# Patient Record
Sex: MALE | Race: ASIAN | URBAN - METROPOLITAN AREA
[De-identification: names, ages, dates, MRNs, and addresses within clinical notes are randomized per-mention and may not be internally consistent; named-entity substitution may affect disease eponyms.]

---

## 2021-04-09 ENCOUNTER — AMBULATORY - HEALTHEAST (OUTPATIENT)
Dept: NURSING | Facility: CLINIC | Age: 26
End: 2021-04-09

## 2021-04-30 ENCOUNTER — AMBULATORY - HEALTHEAST (OUTPATIENT)
Dept: NURSING | Facility: CLINIC | Age: 26
End: 2021-04-30

## 2023-01-27 DIAGNOSIS — R06.83 SNORING: Primary | ICD-10-CM

## 2023-02-11 ENCOUNTER — HEALTH MAINTENANCE LETTER (OUTPATIENT)
Age: 28
End: 2023-02-11

## 2023-05-15 ENCOUNTER — OFFICE VISIT (OUTPATIENT)
Dept: SLEEP MEDICINE | Facility: CLINIC | Age: 28
End: 2023-05-15
Payer: COMMERCIAL

## 2023-05-15 ENCOUNTER — TELEPHONE (OUTPATIENT)
Dept: SLEEP MEDICINE | Facility: CLINIC | Age: 28
End: 2023-05-15

## 2023-05-15 VITALS
HEIGHT: 67 IN | HEART RATE: 82 BPM | BODY MASS INDEX: 29.82 KG/M2 | RESPIRATION RATE: 16 BRPM | DIASTOLIC BLOOD PRESSURE: 71 MMHG | OXYGEN SATURATION: 100 % | WEIGHT: 190 LBS | SYSTOLIC BLOOD PRESSURE: 115 MMHG

## 2023-05-15 DIAGNOSIS — G47.9 SLEEP DISTURBANCE: Primary | ICD-10-CM

## 2023-05-15 DIAGNOSIS — F33.41 RECURRENT MAJOR DEPRESSIVE DISORDER, IN PARTIAL REMISSION (H): ICD-10-CM

## 2023-05-15 DIAGNOSIS — G47.00 INSOMNIA, UNSPECIFIED TYPE: ICD-10-CM

## 2023-05-15 DIAGNOSIS — R06.83 SNORING: ICD-10-CM

## 2023-05-15 DIAGNOSIS — E66.3 OVERWEIGHT (BMI 25.0-29.9): ICD-10-CM

## 2023-05-15 PROCEDURE — 99204 OFFICE O/P NEW MOD 45 MIN: CPT | Performed by: NURSE PRACTITIONER

## 2023-05-15 RX ORDER — GABAPENTIN 100 MG/1
CAPSULE ORAL
COMMUNITY
Start: 2023-03-15 | End: 2024-05-24

## 2023-05-15 RX ORDER — DULOXETIN HYDROCHLORIDE 60 MG/1
60 CAPSULE, DELAYED RELEASE ORAL 2 TIMES DAILY
COMMUNITY
Start: 2023-04-01

## 2023-05-15 RX ORDER — HYDROXYZINE HYDROCHLORIDE 25 MG/1
TABLET, FILM COATED ORAL
COMMUNITY
Start: 2023-01-03

## 2023-05-15 RX ORDER — MULTIPLE VITAMINS W/ MINERALS TAB 9MG-400MCG
1 TAB ORAL DAILY
COMMUNITY

## 2023-05-15 RX ORDER — BUPROPION HYDROCHLORIDE 300 MG/1
TABLET ORAL
COMMUNITY
Start: 2022-04-07

## 2023-05-15 RX ORDER — ARIPIPRAZOLE 5 MG/1
5 TABLET ORAL DAILY
COMMUNITY
Start: 2023-05-11

## 2023-05-15 RX ORDER — TRAZODONE HYDROCHLORIDE 50 MG/1
TABLET, FILM COATED ORAL
COMMUNITY
Start: 2023-03-15 | End: 2024-05-24

## 2023-05-15 ASSESSMENT — SLEEP AND FATIGUE QUESTIONNAIRES
HOW LIKELY ARE YOU TO NOD OFF OR FALL ASLEEP WHILE LYING DOWN TO REST IN THE AFTERNOON WHEN CIRCUMSTANCES PERMIT: MODERATE CHANCE OF DOZING
HOW LIKELY ARE YOU TO NOD OFF OR FALL ASLEEP WHILE SITTING AND READING: WOULD NEVER DOZE
HOW LIKELY ARE YOU TO NOD OFF OR FALL ASLEEP WHILE SITTING INACTIVE IN A PUBLIC PLACE: WOULD NEVER DOZE
HOW LIKELY ARE YOU TO NOD OFF OR FALL ASLEEP WHEN YOU ARE A PASSENGER IN A CAR FOR AN HOUR WITHOUT A BREAK: SLIGHT CHANCE OF DOZING
HOW LIKELY ARE YOU TO NOD OFF OR FALL ASLEEP WHILE WATCHING TV: WOULD NEVER DOZE
HOW LIKELY ARE YOU TO NOD OFF OR FALL ASLEEP WHILE SITTING QUIETLY AFTER LUNCH WITHOUT ALCOHOL: WOULD NEVER DOZE
HOW LIKELY ARE YOU TO NOD OFF OR FALL ASLEEP IN A CAR, WHILE STOPPED FOR A FEW MINUTES IN TRAFFIC: WOULD NEVER DOZE
HOW LIKELY ARE YOU TO NOD OFF OR FALL ASLEEP WHILE SITTING AND TALKING TO SOMEONE: WOULD NEVER DOZE

## 2023-05-15 NOTE — NURSING NOTE
SCHEDULED FOLLOW UP BUT LVMTCB AND RESCHEDULE HST  AND DROP OFF DUE TO POSSIBLE PA NEEDED. ALSO SENT [employer] MESSAGE.    Josue Macias, Respiratory DME Coordinator

## 2023-05-15 NOTE — PATIENT INSTRUCTIONS
"Medicare and Medicaid patients starting on CPAP or biPAP on or after 5/12/2023  Medicare patients should schedule an IN PERSON CLINIC appointment to provide your CPAP/biPAP usage data to a provider within 90 days of starting CPAP    Virtual visits are no longer allowed for this visit for Medicare                MY TREATMENT INFORMATION FOR SLEEP APNEA-  Adan Britton    DOCTOR : MEERA Lane CNP    Am I having a sleep study at a sleep center?  --->Due to normal delays, you will be contacted within 2-4 weeks to schedule    Am I having a home sleep study?  --->Watch the video for the device you are using:    -/drop off device-   https://www.XM Radio.com/watch?v=yGGFBdELGhk    -Disposable device sent out require phone/computer application-   https://www.StoreFlixube.com/watch?v=BCce_vbiwxE      Frequently asked questions:  1. What is Obstructive Sleep Apnea (ANTONIO)? ANTONIO is the most common type of sleep apnea. Apnea means, \"without breath.\"  Apnea is most often caused by narrowing or collapse of the upper airway as muscles relax during sleep.   Almost everyone has occasional apneas. Most people with sleep apnea have had brief interruptions at night frequently for many years.  The severity of sleep apnea is related to how frequent and severe the events are.   2. What are the consequences of ANTONIO? Symptoms include: feeling sleepy during the day, snoring loudly, gasping or stopping of breathing, trouble sleeping, and occasionally morning headaches or heartburn at night.  Sleepiness can be serious and even increase the risk of falling asleep while driving. Other health consequences may include development of high blood pressure and other cardiovascular disease in persons who are susceptible. Untreated ANTONIO  can contribute to heart disease, stroke and diabetes.   3. What are the treatment options? In most situations, sleep apnea is a lifelong disease that must be managed with daily therapy. Medications are not effective " for sleep apnea and surgery is generally not considered until other therapies have been tried. Your treatment is your choice . Continuous Positive Airway (CPAP) works right away and is the therapy that is effective in nearly everyone. An oral device to hold your jaw forward is usually the next most reliable option. Other options include postioning devices (to keep you off your back), weight loss, and surgery including a tongue pacing device. There is more detail about some of these options below.  4. Are my sleep studies covered by insurance? Although we will request verification of coverage, we advise you also check in advance of the study to ensure there is coverage.    Important tips for those choosing CPAP and similar devices  For new devices, sign up for device HILTON to monitor your device for your followup visits  We encourage you to utilize the [employer] hilton or website (myAir web (resmed.com) ) to monitor your therapy progress and share the data with your healthcare team when you discuss your sleep apnea.                                                    Know your equipment:  CPAP is continuous positive airway pressure that prevents obstructive sleep apnea by keeping the throat from collapsing while you are sleeping. In most cases, the device is  smart  and can slowly self-adjusts if your throat collapses and keeps a record every day of how well you are treated-this information is available to you and your care team.  BPAP is bilevel positive airway pressure that keeps your throat open and also assists each breath with a pressure boost to maintain adequate breathing.  Special kinds of BPAP are used in patients who have inadequate breathing from lung or heart disease. In most cases, the device is  smart  and can slowly self-adjusts to assist breathing. Like CPAP, the device keeps a record of how well you are treated.  Your mask is your connection to the device. You get to choose what feels most  comfortable and the staff will help to make sure if fits. Here: are some examples of the different masks that are available:       Key points to remember on your journey with sleep apnea:  Sleep study.  PAP devices often need to be adjusted during a sleep study to show that they are effective and adjusted right.  Good tips to remember: Try wearing just the mask during a quiet time during the day so your body adapts to wearing it. A humidifier is recommended for comfort in most cases to prevent drying of your nose and throat. Allergy medication from your provider may help you if you are having nasal congestion.  Getting settled-in. It takes more than one night for most of us to get used to wearing a mask. Try wearing just the mask during a quiet time during the day so your body adapts to wearing it. A humidifier is recommended for comfort in most cases. Our team will work with you carefully on the first day and will be in contact within 4 days and again at 2 and 4 weeks for advice and remote device adjustments. Your therapy is evaluated by the device each day.   Use it every night. The more you are able to sleep naturally for 7-8 hours, the more likely you will have good sleep and to prevent health risks or symptoms from sleep apnea. Even if you use it 4 hours it helps. Occasionally all of us are unable to use a medical therapy, in sleep apnea, it is not dangerous to miss one night.   Communicate. Call our skilled team on the number provided on the first day if your visit for problems that make it difficult to wear the device. Over 2 out of 3 patients can learn to wear the device long-term with help from our team. Remember to call our team or your sleep providers if you are unable to wear the device as we may have other solutions for those who cannot adapt to mask CPAP therapy. It is recommended that you sleep your sleep provider within the first 3 months and yearly after that if you are not having problems.   Use it  for your health. We encourage use of CPAP masks during daytime quiet periods to allow your face and brain to adapt to the sensation of CPAP so that it will be a more natural sensation to awaken to at night or during naps. This can be very useful during the first few weeks or months of adapting to CPAP though it does not help medically to wear CPAP during wakefulness and  should not be used as a strategy just to meet guidelines.  Take care of your equipment. Make sure you clean your mask and tubing using directions every day and that your filter and mask are replaced as recommended or if they are not working.     BESIDES CPAP, WHAT OTHER THERAPIES ARE THERE?    Positioning Device  Positioning devices are generally used when sleep apnea is mild and only occurs on your back.This example shows a pillow that straps around the waist. It may be appropriate for those whose sleep study shows milder sleep apnea that occurs primarily when lying flat on one's back. Preliminary studies have shown benefit but effectiveness at home may need to be verified by a home sleep test. These devices are generally not covered by medical insurance.  Examples of devices that maintain sleeping on the back to prevent snoring and mild sleep apnea.    Belt type body positioner  http://[facility].[employer]/    Electronic reminder  http://nightshifttherapy.com/            Oral Appliance  What is oral appliance therapy?  An oral appliance device fits on your teeth at night like a retainer used after having braces. The device is made by a specialized dentist and requires several visits over 1-2 months before a manufactured device is made to fit your teeth and is adjusted to prevent your sleep apnea. Once an oral device is working properly, snoring should be improved. A home sleep test may be recommended at that time if to determine whether the sleep apnea is adequately treated.       Some things to remember:  -Oral devices are often, but not always, covered  by your medical insurance. Be sure to check with your insurance provider.   -If you are referred for oral therapy, you will be given a list of specialized dentists to consider or you may choose to visit the Web site of the American Academy of Dental Sleep Medicine  -Oral devices are less likely to work if you have severe sleep apnea or are extremely overweight.     More detailed information  An oral appliance is a small acrylic device that fits over the upper and lower teeth  (similar to a retainer or a mouth guard). This device slightly moves jaw forward, which moves the base of the tongue forward, opens the airway, improves breathing for effective treat snoring and obstructive sleep apnea in perhaps 7 out of 10 people .  The best working devices are custom-made by a dental device  after a mold is made of the teeth 1, 2, 3.  When is an oral appliance indicated?  Oral appliance therapy is recommended as a first-line treatment for patients with primary snoring, mild sleep apnea, and for patients with moderate sleep apnea who prefer appliance therapy to use of CPAP4, 5. Severity of sleep apnea is determined by sleep testing and is based on the number of respiratory events per hour of sleep.   How successful is oral appliance therapy?  The success rate of oral appliance therapy in patients with mild sleep apnea is 75-80% while in patients with moderate sleep apnea it is 50-70%. The chance of success in patients with severe sleep apnea is 40-50%. The research also shows that oral appliances have a beneficial effect on the cardiovascular health of ANTONIO patients at the same magnitude as CPAP therapy7.  Oral appliances should be a second-line treatment in cases of severe sleep apnea, but if not completely successful then a combination therapy utilizing CPAP plus oral appliance therapy may be effective. Oral appliances tend to be effective in a broad range of patients although studies show that the patients who  have the highest success are females, younger patients, those with milder disease, and less severe obesity. 3, 6.   Finding a dentist that practices dental sleep medicine  Specific training is available through the American Academy of Dental Sleep Medicine for dentists interested in working in the field of sleep. To find a dentist who is educated in the field of sleep and the use of oral appliances, near you, visit the Web site of the American Academy of Dental Sleep Medicine.    References  1. Taylor, et al. Objectively measured vs self-reported compliance during oral appliance therapy for sleep-disordered breathing. Chest 2013; 144(5): 2650-8542.  2. Lorri, et al. Objective measurement of compliance during oral appliance therapy for sleep-disordered breathing. Thorax 2013; 68(1): 91-96.  3. Tara et al. Mandibular advancement devices in 620 men and women with ANTONIO and snoring: tolerability and predictors of treatment success. Chest 2004; 125: 6594-5061.  4. Zhanna, et al. Oral appliances for snoring and ANTONIO: a review. Sleep 2006; 29: 244-262.  5. Robert et al. Oral appliance treatment for ANTONIO: an update. J Clin Sleep Med 2014; 10(2): 215-227.  6. Soledad et al. Predictors of OSAH treatment outcome. J Dent Res 2007; 86: 8581-1461.      Weight Loss:    Weight loss is a long-term strategy that may improve sleep apnea in some patients.    Weight management is a personal decision and the decision should be based on your interest and the potential benefits.  If you are interested in exploring weight loss strategies, the following discussion covers the impact on weight loss on sleep apnea and the approaches that may be successful.    Being overweight does not necessarily mean you will have health consequences.  Those who have BMI over 35 or over 27 with existing medical conditions carries greater risk.   Weight loss decreases severity of sleep apnea in most people with obesity. For those with mild  obesity who have developed snoring with weight gain, even 15-30 pound weight loss can improve and occasionally eliminate sleep apnea.  Structured and life-long dietary and health habits are necessary to lose weight and keep healthier weight levels.     Though there may be significant health benefits from weight loss, long-term weight loss is very difficult to achieve- studies show success with dietary management in less than 10% of people. In addition, substantial weight loss may require years of dietary control and may be difficult if patients have severe obesity. In these cases, surgical management may be considered.  Finally, older individuals who have tolerated obesity without health complications may be less likely to benefit from weight loss strategies.      Your BMI is Body mass index is 29.76 kg/m .    What is BMI?  Body mass index (BMI) is one way to tell whether you are at a healthy weight, overweight, or obese. It measures your weight in relation to your height.  A BMI of 18.5 to 24.9 is in the healthy range. A person with a BMI of 25 to 29.9 is considered overweight, and someone with a BMI of 30 or greater is considered obese.  Another way to find out if you are at risk for health problems caused by overweight and obesity is to measure your waist. If you are a woman and your waist is more than 35 inches, or if you are a man and your waist is more than 40 inches, your risk of disease may be higher.  More than two-thirds of American adults are considered overweight or obese. Being overweight or obese increases the risk for further weight gain.  Excess weight may lead to heart disease and diabetes. Creating and following plans for healthy eating and physical activity may help you improve your health.    Methods for maintaining or losing weight.  Weight control is part of healthy lifestyle and includes exercise, emotional health, and healthy eating habits.  Careful eating habits lifelong is the mainstay of  weight control.  Though there are significant health benefits from weight loss, long-term weight loss with diet alone may be very difficult to achieve- studies show long-term success with dietary management in less than 10% of people. Attaining a healthy weight may be especially difficult to achieve in those with severe obesity. In some cases, medications, devices and surgical management might be considered.    What can you do?  If you are overweight or obese and are interested in methods for weight loss, you should discuss this with your provider. In addition, we recommend that you review healthy life styles and methods for weight loss available through the National Institutes of Health patient information sites:   http://win.niddk.nih.gov/publications/index.htm    Surgery:    Surgery for obstructive sleep apnea is considered generally only when other therapies fail to work. Surgery may be discussed with you if you are having a difficult time tolerating CPAP and or when there is an abnormal structure that requires surgical correction.  Nose and throat surgeries often enlarge the airway to prevent collapse.  Most of these surgeries create pain for 1-2 weeks and up to half of the most common surgeries are not effective throughout life.  You should carefully discuss the benefits and drawbacks to surgery with your sleep provider and surgeon to determine if it is the best solution for you.   More information  Surgery for ANTONIO is directed at areas that are responsible for narrowing or complete obstruction of the airway during sleep.  There are a wide range of procedures available to enlarge and/or stabilize the airway to prevent blockage of breathing in the three major areas where it can occur: the palate, tongue, and nasal regions.  Successful surgical treatment depends on the accurate identification of the factors responsible for obstructive sleep apnea in each person.  A personalized approach is required because there  is no single treatment that works well for everyone.  Because of anatomic variation, consultation with an examination by a sleep surgeon is a critical first step in determining what surgical options are best for each patient.  In some cases, examination during sedation may be recommended in order to guide the selection of procedures.  Patients will be counseled about risks and benefits as well as the typical recovery course after surgery. Surgery is typically not a cure for a person s ANTONIO.  However, surgery will often significantly improve one s ANTONIO severity (termed  success rate ).  Even in the absence of a cure, surgery will decrease the cardiovascular risk associated with OSA7; improve overall quality of life8 (sleepiness, functionality, sleep quality, etc).      Palate Procedures:  Patients with ANTONIO often have narrowing of their airway in the region of their tonsils and uvula.  The goals of palate procedures are to widen the airway in this region as well as to help the tissues resist collapse.  Modern palate procedure techniques focus on tissue conservation and soft tissue rearrangement, rather than tissue removal.  Often the uvula is preserved in this procedure. Residual sleep apnea is common in patient after pharyngoplasty with an average reduction in sleep apnea events of 33%2.      Tongue Procedures:  ExamWhile patients are awake, the muscles that surround the throat are active and keep this region open for breathing. These muscles relax during sleep, allowing the tongue and other structures to collapse and block breathing.  There are several different tongue procedures available.  Selection of a tongue base procedure depends on characteristics seen on physical exam.  Generally, procedures are aimed at removing bulky tissues in this area or preventing the back of the tongue from falling back during sleep.  Success rates for tongue surgery range from 50-62%3.    Hypoglossal Nerve Stimulation:  Hypoglossal  nerve stimulation has recently received approval from the [place] Food and Drug Administration for the treatment of obstructive sleep apnea.  This is based on research showing that the system was safe and effective in treating sleep apnea6.  Results showed that the median AHI score decreased 68%, from 29.3 to 9.0. This therapy uses an implant system that senses breathing patterns and delivers mild stimulation to airway muscles, which keeps the airway open during sleep.  The system consists of three fully implanted components: a small generator (similar in size to a pacemaker), a breathing sensor, and a stimulation lead.  Using a small handheld remote, a patient turns the therapy on before bed and off upon awakening.    Candidates for this device must be greater than 18 years of age, have moderate to severe ANTONIO (AHI between 15-65), BMI less than 35, have tried CPAP/oral appliance for at least 8 weeks without success, and have appropriate upper airway anatomy (determined by a sleep endoscopy performed by Dr. Cooper Milan).    Hypoglossal Nerve Stimulation Pathway:    The sleep surgeon s office will work with the patient through the insurance prior-authorization process (including communications and appeals).    Nasal Procedures:  Nasal obstruction can interfere with nasal breathing during the day and night.  Studies have shown that relief of nasal obstruction can improve the ability of some patients to tolerate positive airway pressure therapy for obstructive sleep apnea1.  Treatment options include medications such as nasal saline, topical corticosteroid and antihistamine sprays, and oral medications such as antihistamines or decongestants. Non-surgical treatments can include external nasal dilators for selected patients. If these are not successful by themselves, surgery can improve the nasal airway either alone or in combination with these other options.      Combination Procedures:  Combination of surgical  procedures and other treatments may be recommended, particularly if patients have more than one area of narrowing or persistent positional disease.  The success rate of combination surgery ranges from 66-80%2,3.    References  Tamara PITT. The Role of the Nose in Snoring and Obstructive Sleep Apnoea: An Update.  Eur Arch Otorhinolaryngol. 2011; 268: 1365-73.   Norbert SM; Adore JA; Khadar JR; Pallanch JF; Deyvi MB; Rod SG; Angeles COTTRELL. Surgical modifications of the upper airway for obstructive sleep apnea in adults: a systematic review and meta-analysis. SLEEP 2010;33(10):8930-2838. Natividad RICO. Hypopharyngeal surgery in obstructive sleep apnea: an evidence-based medicine review.  Arch Otolaryngol Head Neck Surg. 2006 Feb;132(2):206-13.  Ray YH1, Miriam Y, Jose REED. The efficacy of anatomically based multilevel surgery for obstructive sleep apnea. Otolaryngol Head Neck Surg. 2003 Oct;129(4):327-35.  Kezirian E, Goldberg A. Hypopharyngeal Surgery in Obstructive Sleep Apnea: An Evidence-Based Medicine Review. Arch Otolaryngol Head Neck Surg. 2006 Feb;132(2):206-13.  Randell PJ et al. Upper-Airway Stimulation for Obstructive Sleep Apnea.  N Engl J Med. 2014 Jan 9;370(2):139-49.  Sabine Y et al. Increased Incidence of Cardiovascular Disease in Middle-aged Men with Obstructive Sleep Apnea. Am J Respir Crit Care Med; 2002 166: 159-165  Cheung EM et al. Studying Life Effects and Effectiveness of Palatopharyngoplasty (SLEEP) study: Subjective Outcomes of Isolated Uvulopalatopharyngoplasty. Otolaryngol Head Neck Surg. 2011; 144: 623-631.        WHAT IF I ONLY HAVE SNORING?    Mandibular advancement devices, lateral sleep positioning, long-term weight loss and treatment of nasal allergies have been shown to improve snoring.  Exercising tongue muscles with a game (https://apps.[employer]/us/hilton/soundly-reduce-snoring/ac2712307972) or stimulating the tongue during the day with a device (https://doi.org/10.3040/red41757944) have  improved snoring in some individuals.    Remember to Drive Safe... Drive Alive     Sleep health profoundly affects your health, mood, and your safety.  Thirty three percent of the population (one in three of us) is not getting enough sleep and many have a sleep disorder. Not getting enough sleep or having an untreated / undertreated sleep condition may make us sleepy without even knowing it. In fact, our driving could be dramatically impaired due to our sleep health. As your provider, here are some things I would like you to know about driving:     Here are some warning signs for impairment and dangerous drowsy driving:              -Having been awake more than 16 hours               -Looking tired               -Eyelid drooping              -Head nodding (it could be too late at this point)              -Driving for more than 30 minutes     Some things you could do to make the driving safer if you are experiencing some drowsiness:              -Stop driving and rest              -Call for transportation              -Make sure your sleep disorder is adequately treated     Some things that have been shown NOT to work when experiencing drowsiness while driving:              -Turning on the radio              -Opening windows              -Eating any  distracting  /  entertaining  foods (e.g., sunflower seeds, candy, or any other)              -Talking on the phone      Your decision may not only impact your life, but also the life of others. Please, remember to drive safe for yourself and all of us.

## 2023-05-15 NOTE — NURSING NOTE
SCHEDULE DTaP FOR HST AND FOLLOW UP, WILL SEND [place] MESSAGE WITH APPT DETAILS.    Josue Macias

## 2023-05-15 NOTE — PROGRESS NOTES
Outpatient Sleep Medicine Consultation:      Name: Adan Britton MRN# [id number]   Age: 27 year old YOB: 1995     Date of Consultation: May 15, 2023  Consultation is requested by: Ying Delaney [facility]  Primary care provider: No Ref-Primary, Physician       Reason for Sleep Consult:     Adan Britton is sent by Ying Olvera for a sleep consultation regarding snoring, RLS?.    Patient s Reason for visit  Adan Britton main reason for visit: Irregular sleep patterns  Patient states problem(s) started: 2020  Adan Britton's goals for this visit: Normalize sleep patterns           Assessment and Plan:     Summary Sleep Diagnoses:  1. Sleep disturbance  2. Snoring  3. Insomnia, unspecified type  4. Recurrent major depressive disorder, in partial remission (H)  5. Overweight (BMI 25.0-29.9)  - Adult Sleep Eval & Management  Referral  - HST-Home Sleep Apnea Test - Noxturnal Returnable; Future      Comorbid Diagnoses:  1.  Major depression  2.  Anxiety  3.  Overweight      Summary Recommendations:  1.  Recommend further evaluation with home sleep apnea testing (HST) for possible sleep disordered breathing.  His STOP-BANG score is 2 which suggest a low risk of ANTONIO, however, the patient does sleep alone and his symptoms may be underestimated.  There is some question of whether or not he has RLS symptoms due to reports of kicking his legs at night previously.  His symptoms are consistent with possible obstructive sleep apnea.  2.  All potential therapeutic options including positive airway pressure, mandibular advancing oral appliances, and surgical options were discussed. Also counseled about impact of weight loss on ANTONIO.   3.  Follow-up in approximately 2 weeks after the sleep study to review the results and to determine next steps.    Orders Placed This Encounter   Procedures     HST-Home Sleep Apnea Test - Noxturnal Returnable         Summary Counseling:    Sleep Testing  Reviewed  Obstructive Sleep Apnea Reviewed  Complications of Untreated Sleep Apnea Reviewed      Medical Decision-making:   Educational materials provided in instructions    Total time spent reviewing medical records, history and physical examination, review of previous testing and interpretation as well as documentation on this date: 45 minutes    CC: No ref. provider found          History of Present Illness:     Adan Britton is a 27-year-old male with a pertinent past medical history of major depression, anxiety, who presents today with symptoms of waking up every 2 hours per night, these symptoms wax and wanes about every 3 weeks, occasionally wakes with feeling of shortness of breath, denies urge to move legs at night that does not prevent him from sleeping, but has been told he moves his legs in sleep at times.  This patient was referred by his primary care provider for further evaluation of possible sleep disordered breathing.    Past Sleep Evaluations: No    SLEEP-WAKE SCHEDULE:     Work/School Days: Patient goes to school/work: Yes   Usually gets into bed at 10.30pm-11.30pm  Takes patient about 30min to fall asleep  Has trouble falling asleep 2 nights per week  Wakes up in the middle of the night 2-3 times.  Wakes up due to Uncertain  He has trouble falling back asleep 2 times a week.   It usually takes Depends to get back to sleep  Patient is usually up at 9am  Uses alarm: Yes    Weekends/Non-work Days/All Other Days:  Usually gets into bed at 11pm-Midnight   Takes patient about 30min to fall asleep  Patient is usually up at 10am-11am  Uses alarm: No    Sleep Need  Patient gets  9-10h sleep on average   Patient thinks he needs about 10h sleep    Adan Britton prefers to sleep in this position(s): Back   Patient states they do the following activities in bed: Use phone, computer, or tablet    Naps  Patient takes a purposeful nap 2 times a week and naps are usually 2h in duration  He feels better after a nap:  Yes  He dozes off unintentionally 0 days per week  Patient has had a driving accident or near-miss due to sleepiness/drowsiness: No      SLEEP DISRUPTIONS:    Breathing/Snoring  Patient snores:Yes  Other people complain about his snoring: Yes  Patient has been told he stops breathing in his sleep:No  He has issues with the following: Morning mouth dryness    Movement:  Patient gets pain, discomfort, with an urge to move:  No  It happens when he is resting:  No  It happens more at night:  No  Patient has been told he kicks his legs at night:  Yes     Behaviors in Sleep:  Adan Britton has experienced the following behaviors while sleeping: Teeth grinding - no mouthguard use  He has experienced sudden muscle weakness during the day: No      Is there anything else you would like your sleep provider to know:        CAFFEINE AND OTHER SUBSTANCES:    Patient consumes caffeinated beverages per day:  1, 4 shots  Last caffeine use is usually: Noon-2pm  List of any prescribed or over the counter stimulants that patient takes:    List of any prescribed or over the counter sleep medication patient takes: Gabapentin 100mg, Trazodone 200mg  List of previous sleep medications that patient has tried: Hydroxyzine, Gabapentin 600mg  Patient drinks alcohol to help them sleep: No  Patient drinks alcohol near bedtime: No    Family History:  Patient has a family member been diagnosed with a sleep disorder: Yes  Insomnia, Narcolepsy         SCALES:    EPWORTH SLEEPINESS SCALE         5/15/2023     7:42 AM    Madeline Sleepiness Scale ( GAB Kevin  1990-1997[facility]/English - Final version - 21 Nov 07 - [facility] [place].)   Sitting and reading Would never doze   Watching TV Would never doze   Sitting, inactive in a public place (e.g. a theatre or a meeting) Would never doze   As a passenger in a car for an hour without a break Slight chance of dozing   Lying down to rest in the afternoon when circumstances permit Moderate chance of  "dozing   Sitting and talking to someone Would never doze   Sitting quietly after a lunch without alcohol Would never doze   In a car, while stopped for a few minutes in traffic Would never doze   Durkee Score (MC) 3   Durkee Score (Sleep) 3         INSOMNIA SEVERITY INDEX (CHRISTIAN)          5/15/2023     7:36 AM   Insomnia Severity Index (CHRISTIAN)   Difficulty falling asleep 1   Difficulty staying asleep 2   Problems waking up too early 2   How SATISFIED/DISSATISFIED are you with your CURRENT sleep pattern? 3   How NOTICEABLE to others do you think your sleep problem is in terms of impairing the quality of your life? 2   How WORRIED/DISTRESSED are you about your current sleep problem? 3   To what extent do you consider your sleep problem to INTERFERE with your daily functioning (e.g. daytime fatigue, mood, ability to function at work/daily chores, concentration, memory, mood, etc.) CURRENTLY? 2   CHRISTIAN Total Score 15       Guidelines for Scoring/Interpretation:  Total score categories:  0-7 = No clinically significant insomnia   8-14 = Subthreshold insomnia   15-21 = Clinical insomnia (moderate severity)  22-28 = Clinical insomnia (severe)  Used via courtesy of www.[employer].va.gov with permission from Win Lema PhD., [facility]      STOP BANG score: 2        5/15/2023     8:21 AM   STOP BANG Questionnaire (  2008, the American Society of Anesthesiologists, Inc. [employer].)   B/P Clinic: 115/71   BMI Clinic: 29.76         GAD7         View : No data to display.                  CAGE-AID         View : No data to display.                CAGE-AID reprinted with permission from the [place] Medical Journal, KISHAN Fernandez. and LAURIE Vivar, \"Conjoint screening questionnaires for alcohol and drug abuse\" [place] Medical Journal 94: 135-140, 1995.      PATIENT HEALTH QUESTIONNAIRE-9 (PHQ - 9)         View : No data to display.                Developed by Bre Montes De Oca " Floyd Henderson and colleagues, with an educational krysten from [employer] No permission required to reproduce, translate, display or distribute.        Allergies:    Allergies   Allergen Reactions     Ibuprofen Rash       Medications:    Current Outpatient Medications   Medication Sig Dispense Refill     ARIPiprazole (ABILIFY) 5 MG tablet        buPROPion (WELLBUTRIN XL) 300 MG 24 hr tablet        cholecalciferol (VITAMIN D3) 125 mcg (5000 units) capsule        DULoxetine (CYMBALTA) 60 MG capsule        gabapentin (NEURONTIN) 100 MG capsule        hydrOXYzine (ATARAX) 25 MG tablet AS DIRECTED 1TAB IN AM OR AFTERNOON AS NEEDED FOR ANXIETY &2-4TABS AT BEDTIME AS NEEDED FOR INSOMNIA       multivitamin w/minerals (MULTI-VITAMIN) tablet Take 1 tablet by mouth daily       traZODone (DESYREL) 50 MG tablet          Problem List:  There are no problems to display for this patient.       Past Medical/Surgical History:  No past medical history on file.  No past surgical history on file.    Social History:  Social History     Socioeconomic History     Marital status: Single     Spouse name: Not on file     Number of children: Not on file     Years of education: Not on file     Highest education level: Not on file   Occupational History     Not on file   Tobacco Use     Smoking status: Never     Smokeless tobacco: Never   Vaping Use     Vaping status: Never Used   Substance and Sexual Activity     Alcohol use: Not on file     Drug use: Not on file     Sexual activity: Not on file   Other Topics Concern     Not on file   Social History Narrative     Not on file     Social Determinants of Health     Financial Resource Strain: Not on file   Food Insecurity: Not on file   Transportation Needs: Not on file   Physical Activity: Not on file   Stress: Not on file   Social Connections: Not on file   Intimate Partner Violence: Not on file   Housing Stability: Not on file       Family History:  No family history on file.    Review  "of Systems:  A complete review of systems reviewed by me is negative with the exeption of what has been mentioned in the history of present illness.  In the last TWO WEEKS have you experienced any of the following symptoms?  Fevers: No  Night Sweats: Yes  Weight Gain: No  Pain at Night: No  Double Vision: No  Changes in Vision: No  Difficulty Breathing through Nose: No  Sore Throat in Morning: No  Dry Mouth in the Morning: Yes  Shortness of Breath Lying Flat: No  Shortness of Breath With Activity: No  Awakening with Shortness of Breath: No  Increased Cough: No  Heart Racing at Night: No  Swelling in Feet or Legs: No  Diarrhea at Night: No  Heartburn at Night: No  Urinating More than Once at Night: No  Losing Control of Urine at Night: No  Joint Pains at Night: No  Headaches in Morning: No  Weakness in Arms or Legs: No  Depressed Mood: Yes  Anxiety: Yes     Physical Examination:  Vitals: /71   Pulse 82   Resp 16   Ht 1.702 m (5' 7\")   Wt 86.2 kg (190 lb)   SpO2 100%   BMI 29.76 kg/m    BMI= Body mass index is 29.76 kg/m .           GENERAL APPEARANCE: healthy, alert, no distress and cooperative  EYES: Eyes grossly normal to inspection, PERRL, conjunctivae and sclerae normal, lids and lashes normal and wearing eyeglasses  HENT: nose and mouth without ulcers or lesions, oropharynx crowded, tongue base enlarged, oral mucous membranes moist and normal cephalic/atraumatic  NECK: no adenopathy, no asymmetry, masses, or scars, thyroid normal to palpation and trachea midline and normal to palpation  RESP: lungs clear to auscultation - no rales, rhonchi or wheezes  CV: regular rates and rhythm, normal S1 S2, no S3 or S4 and no murmur, click or rub  ABDOMEN: bowel sounds normal and soft, non-tender  MS: extremities normal- no gross deformities noted  NEURO: Alert and oriented x3, normal strength and tone, mentation intact and speech normal  PSYCH: mentation appears normal and affect normal/bright  Mallampati Class: " II.  Tonsillar Stage: 3  extending beyond pillars.         Data: All pertinent previous laboratory data reviewed     No results for input(s): NA, POTASSIUM, CHLORIDE, CO2, ANIONGAP, GLC, BUN, CR, YANNICK in the last 12264 hours.    No results for input(s): WBC, RBC, HGB, HCT, MCV, MCH, MCHC, RDW, PLT in the last 51833 hours.    No results for input(s): PROTTOTAL, ALBUMIN, BILITOTAL, ALKPHOS, AST, ALT, BILIDIRECT in the last 89358 hours.    No results found for: TSH    No results found for: UAMP, UBARB, BENZODIAZEUR, UCANN, UCOC, OPIT, UPCP    No results found for: IRONSAT, [id number], JAZZY    No results found for: PH, PHARTERIAL, PO2, UB9ATOVUCXI, SAT, PCO2, HCO3, BASEEXCESS, JORDY, BEB    @LABRCNTIPR(phv:4,pco2v:4,po2v:4,hco3v:4,johan:4,o2per:4)@    Echocardiology: No results found for this or any previous visit (from the past 4320 hour(s)).    Chest x-ray: No results found for this or any previous visit from the past 365 days.      Chest CT: No results found for this or any previous visit from the past 365 days.      PFT: Most Recent [place] Pulmonary Function Testing    No results found for: 20001  No results found for: 20002  No results found for: 20003  No results found for: 20015  No results found for: 20016  No results found for: 20027  No results found for: 20028  No results found for: 20029  No results found for: 20079  No results found for: 20080  No results found for: 20081  No results found for: 20335  No results found for: 20105  No results found for: 20053  No results found for: 20054  No results found for: 20055      MEERA Lane CNP 5/15/2023   Sleep Medicine      This note was written with the assistance of the Dragon voice-[email or web address].comation technology software. The final document, although reviewed, may contain errors. For corrections, please contact the office.

## 2023-06-20 ENCOUNTER — OFFICE VISIT (OUTPATIENT)
Dept: SLEEP MEDICINE | Facility: CLINIC | Age: 28
End: 2023-06-20
Payer: COMMERCIAL

## 2023-06-20 DIAGNOSIS — G47.00 INSOMNIA, UNSPECIFIED TYPE: ICD-10-CM

## 2023-06-20 DIAGNOSIS — F33.41 RECURRENT MAJOR DEPRESSIVE DISORDER, IN PARTIAL REMISSION (H): ICD-10-CM

## 2023-06-20 DIAGNOSIS — G47.9 SLEEP DISTURBANCE: ICD-10-CM

## 2023-06-20 DIAGNOSIS — R06.83 SNORING: ICD-10-CM

## 2023-06-20 DIAGNOSIS — E66.3 OVERWEIGHT (BMI 25.0-29.9): ICD-10-CM

## 2023-06-20 PROCEDURE — G0399 HOME SLEEP TEST/TYPE 3 PORTA: HCPCS | Performed by: INTERNAL MEDICINE

## 2023-06-20 NOTE — PROGRESS NOTES
Pt is completing a home sleep test. Pt was instructed on how to put on the Noxturnal T3 device and associated equipment before going to bed and given the opportunity to practice putting it on before leaving the sleep center. Pt was reminded to bring the home sleep test kit back to the center tomorrow, at agreed upon time for download and reporting.   Neck circumference: 42.5 CM / 16.25 inches.  Michelle Landaverde CMA, HST Specialist  [place] / [facility]

## 2023-06-21 ENCOUNTER — DOCUMENTATION ONLY (OUTPATIENT)
Dept: SLEEP MEDICINE | Facility: CLINIC | Age: 28
End: 2023-06-21
Payer: COMMERCIAL

## 2023-06-21 NOTE — PROGRESS NOTES
HST POST-STUDY QUESTIONNAIRE    1. What time did you go to bed?  9 pm  2. How long do you think it took to fall asleep?  30 min  3. What time did you wake up to start the day?  5 am  4. Did you get up during the night at all?  no  5. If you woke up, do you remember approximately what time(s)? NA  6. Did you have any difficulty with the equipment?  No  7. Did you us any type of treatment with this study?  None  8. Was the head of the bed elevated? No  9. Did you sleep in a recliner?  No  10. Did you stop using CPAP at least 3 days before this test?  NA  11. Any other information you'd like us to know? Woke up at 5, and took equipment offto start the day, napped till 8.    This HSAT was performed using a Noxturnal T3 device which recorded snore, sound, movement activity, body position, nasal pressure, oronasal thermal airflow, pulse, oximetry and both chest and abdominal respiratory effort. HSAT data was restricted to the time patient states they were in bed.     HSAT was scored using 1B 4% hypopnea rule.     HST AHI (Non-PAT): 5.3  Snoring was reported as mild and moderate.  Time with SpO2 below 89% was 0.4 minutes.   Overall signal quality was good     Pt will follow up with sleep provider to determine appropriate therapy.

## 2023-06-22 NOTE — PROCEDURES
"HOME SLEEP STUDY INTERPRETATION    Patient: Adan Britton  MRN: [id number]  YOB: 1995  Study Date: 6/20/2023  Referring Provider: Paulina Ref-Primary, Physician;   Ordering Provider: See ESTES CNP     Indications for Home Study: Adan Britton is a 27 year old male who presents with symptoms suggestive of obstructive sleep apnea.    Estimated body mass index is 29.76 kg/m  as calculated from the following:    Height as of 5/15/23: 1.702 m (5' 7\").    Weight as of 5/15/23: 86.2 kg (190 lb).  Total score - [place]: 3 (5/15/2023  7:42 AM)  Total Score: 2 (5/15/2023  7:43 AM)    Data: A full night home sleep study was performed recording the standard physiologic parameters including body position, movement, sound, nasal pressure, thermal oral airflow, chest and abdominal movements with respiratory inductance plethysmography, and oxygen saturation by pulse oximetry. Pulse rate was estimated by oximetry recording. This study was considered adequate based on > 4 hours of quality oximetry and respiratory recording. As specified by the AASM Manual for the Scoring of Sleep and Associated events, version 2.3, Rule VIII.D 1B, 4% oxygen desaturation scoring for hypopneas is used as a standard of care on all home sleep apnea testing.    Analysis Time:  370.7 minutes    Respiration:   Sleep Associated Hypoxemia: sustained hypoxemia was not present. Baseline oxygen saturation was 95%.  Time with saturation less than or equal to 88% was 0.2 minutes. The lowest oxygen saturation was 85%.   Snoring: Snoring was present.  Respiratory events: The home study revealed a presence of 5 obstructive apneas and 6 mixed and central apneas. There were 22 hypopneas resulting in a combined apnea/hypopnea index [AHI] of 5.3 events per hour.  AHI was 8.6 per hour supine, N/A per hour prone, 2.7 per hour on left side, and 2.6 per hour on right side.   Pattern: Excluding events noted above, respiratory rate and pattern was " Normal.    Position: Percent of time spent: supine - 45.3%, prone - 0%, on left - 42.3%, on right - 12.3%.    Heart Rate: By pulse oximetry normal rate was noted.     Assessment:   Mild obstructive sleep apnea.  Sleep associated hypoxemia was not present.    Recommendations:  Consider auto-CPAP at 5-15 cmH2O, oral appliance therapy or positional therapy.  Suggest optimizing sleep hygiene and avoiding sleep deprivation.  Weight management.    Diagnosis Code(s): Obstructive Sleep Apnea G47.33    Klever Hoang DO, June 22, 2023   Diplomate, American Board of Internal Medicine, Sleep Medicine

## 2023-07-18 ENCOUNTER — VIRTUAL VISIT (OUTPATIENT)
Dept: SLEEP MEDICINE | Facility: CLINIC | Age: 28
End: 2023-07-18
Payer: COMMERCIAL

## 2023-07-18 VITALS — BODY MASS INDEX: 28.25 KG/M2 | HEIGHT: 67 IN | WEIGHT: 180 LBS

## 2023-07-18 DIAGNOSIS — G47.33 OSA (OBSTRUCTIVE SLEEP APNEA): Primary | ICD-10-CM

## 2023-07-18 PROCEDURE — 99214 OFFICE O/P EST MOD 30 MIN: CPT | Mod: VID | Performed by: NURSE PRACTITIONER

## 2023-07-18 RX ORDER — TRAZODONE HYDROCHLORIDE 100 MG/1
TABLET ORAL
COMMUNITY
Start: 2023-06-27

## 2023-07-18 RX ORDER — BUPROPION HYDROCHLORIDE 150 MG/1
TABLET ORAL
COMMUNITY
Start: 2023-07-11

## 2023-07-18 ASSESSMENT — SLEEP AND FATIGUE QUESTIONNAIRES
HOW LIKELY ARE YOU TO NOD OFF OR FALL ASLEEP WHILE LYING DOWN TO REST IN THE AFTERNOON WHEN CIRCUMSTANCES PERMIT: HIGH CHANCE OF DOZING
HOW LIKELY ARE YOU TO NOD OFF OR FALL ASLEEP WHILE WATCHING TV: WOULD NEVER DOZE
HOW LIKELY ARE YOU TO NOD OFF OR FALL ASLEEP WHILE SITTING INACTIVE IN A PUBLIC PLACE: SLIGHT CHANCE OF DOZING
HOW LIKELY ARE YOU TO NOD OFF OR FALL ASLEEP WHILE SITTING AND READING: WOULD NEVER DOZE
HOW LIKELY ARE YOU TO NOD OFF OR FALL ASLEEP WHILE SITTING AND TALKING TO SOMEONE: WOULD NEVER DOZE
HOW LIKELY ARE YOU TO NOD OFF OR FALL ASLEEP IN A CAR, WHILE STOPPED FOR A FEW MINUTES IN TRAFFIC: SLIGHT CHANCE OF DOZING
HOW LIKELY ARE YOU TO NOD OFF OR FALL ASLEEP WHEN YOU ARE A PASSENGER IN A CAR FOR AN HOUR WITHOUT A BREAK: WOULD NEVER DOZE
HOW LIKELY ARE YOU TO NOD OFF OR FALL ASLEEP WHILE SITTING QUIETLY AFTER LUNCH WITHOUT ALCOHOL: SLIGHT CHANCE OF DOZING

## 2023-07-18 ASSESSMENT — PAIN SCALES - GENERAL: PAINLEVEL: NO PAIN (0)

## 2023-07-18 NOTE — PROGRESS NOTES
"Virtual Visit Details    Type of service:  Video Visit     Originating Location (pt. Location): Home    Distant Location (provider location):  Off-site  Platform used for Video Visit: codesy      Home Sleep Apnea Testing Results Visit:    Chief Complaint   Patient presents with     RECHECK     HST follow up       Adanfermin Britton is a 27 year old male with a pertinent past medical history of major depression and anxiety who returns to [facility] after having had Home Sleep Apnea Testing.  He presented with symptoms suggestive of obstructive sleep apnea.    Estimated body mass index is 28.19 kg/m  as calculated from the following:    Height as of this encounter: 1.702 m (5' 7\").    Weight as of this encounter: 81.6 kg (180 lb).    [place] Sleepiness Scale: 6/24   Total Score: 2 (5/15/2023  7:43 AM)  Insomnia Severity Index: 11/28    Home Sleep Apnea Testing - 6/20/2023: Weight: 190 lbs  Analysis Time:  370.7 minutes     Respiration:   Sleep Associated Hypoxemia: sustained hypoxemia was not present. Baseline oxygen saturation was 95%.  Time with saturation less than or equal to 88% was 0.2 minutes. The lowest oxygen saturation was 85%.   Snoring: Snoring was present.  Snoring was reported as mild and moderate.  Respiratory events: The home study revealed a presence of 5 obstructive apneas and 6 mixed and central apneas. There were 22 hypopneas resulting in a combined apnea/hypopnea index [AHI] of 5.3 events per hour.  AHI was 8.6 per hour supine, N/A per hour prone, 2.7 per hour on left side, and 2.6 per hour on right side.   Pattern: Excluding events noted above, respiratory rate and pattern was Normal.     Position: Percent of time spent: supine - 45.3%, prone - 0%, on left - 42.3%, on right - 12.3%.     Heart Rate: By pulse oximetry normal rate was noted.      Assessment:   Mild obstructive sleep apnea.  Sleep associated hypoxemia was not present.       Overall signal quality was Good.     Adan Malin" "Reba reports that he slept Good .     Home Sleep Apnea Testing was reviewed in detail today with Adan Malin and a copy given to him for his records.    Past medical/surgical history, family history, social history, medications and allergies were reviewed.      There is no problem list on file for this patient.    Current Outpatient Medications   Medication     ARIPiprazole (ABILIFY) 5 MG tablet     buPROPion (WELLBUTRIN XL) 150 MG 24 hr tablet     buPROPion (WELLBUTRIN XL) 300 MG 24 hr tablet     cholecalciferol (VITAMIN D3) 125 mcg (5000 units) capsule     DULoxetine (CYMBALTA) 60 MG capsule     gabapentin (NEURONTIN) 100 MG capsule     hydrOXYzine (ATARAX) 25 MG tablet     multivitamin w/minerals (MULTI-VITAMIN) tablet     traZODone (DESYREL) 100 MG tablet     traZODone (DESYREL) 50 MG tablet     No current facility-administered medications for this visit.       Ht 1.702 m (5' 7\")   Wt 81.6 kg (180 lb)   BMI 28.19 kg/m      Impression/Plan:  Mild Obstructive Sleep Apnea.   - Sleep associated hypoxemia was not present.     Treatment options discussed today including  auto-CPAP at 5-15 cmH2O, oral appliance therapy, positional therapy or weight management.    Elected treatment with positional therapy.  We discussed the Slumber Bump positional sleep device which is available OTC through amazon.com and information was provided in the AVS for his review.  If positional therapy is not effective or intolerant he would like to consider CPAP therapy.  I have also encouraged him to follow-up with his mental health provider with regard to his medications for insomnia as they may be contributing to his brain fog and need for excessive sleep.    Follow-up in approximately 3 months.  He will contact me via Nitch message in approximately 1 month after obtaining positional device therapy trial.  Consideration for trial of CPAP may be discussed at that time should positional placed not provide significant benefit.    30 minutes " spent with patient with >50% spent in counseling, chart review/documentation, and coordination of care on the date of the encounter.      MEERA Lane CNP  Sleep Medicine      CC:  No Ref-Primary, Physician,     This note was written with the assistance of the Dragon voice-dictation technology software. The final document, although reviewed, may contain errors. For corrections, please contact the office.

## 2023-07-18 NOTE — PATIENT INSTRUCTIONS
POSITIONAL SLEEP DEVICES:  Devices that maintain sleeping on the back to prevent snoring and mild sleep apnea.    Http://[employer]/  https://[email or web address]-Noodle-Positional-Aid-[email or web address]/K90AL88E1E  https://www.Rpptrip.com

## 2023-07-18 NOTE — NURSING NOTE
Is the patient currently in the state of [place]? YES    Visit mode:VIDEO    If the visit is dropped, the patient can be reconnected by: VIDEO VISIT: Text to cell phone: [phone]    Will anyone else be joining the visit? NO    How would you like to obtain your AVS? MyChart    Are changes needed to the allergy or medication list? YES: medication notes entered with dosage changes    Reason for visit: RECHECK (HST follow up)    Has patient had flu shot for current/most recent flu season? If so, when? Yes: 10/4/2022    TINO Moran/DESIRAE

## 2023-08-22 ENCOUNTER — OFFICE VISIT (OUTPATIENT)
Dept: FAMILY MEDICINE | Facility: CLINIC | Age: 28
End: 2023-08-22
Payer: OTHER MISCELLANEOUS

## 2023-08-22 VITALS
BODY MASS INDEX: 29.25 KG/M2 | HEIGHT: 68 IN | TEMPERATURE: 98.2 F | WEIGHT: 193 LBS | OXYGEN SATURATION: 97 % | HEART RATE: 100 BPM | DIASTOLIC BLOOD PRESSURE: 79 MMHG | SYSTOLIC BLOOD PRESSURE: 156 MMHG

## 2023-08-22 DIAGNOSIS — Z48.02 VISIT FOR SUTURE REMOVAL: Primary | ICD-10-CM

## 2023-08-22 NOTE — PROGRESS NOTES
HPI       Adan Britton is a 27 year old  who presents for   Chief Complaint   Patient presents with    Suture Removal     27 year-old male presents to clinic for suture removal of right index finger from laceration of finger on pipe at work. 5 stitches placed on 8/11. Last tetanus shot about 3 years ago so he did not receive another one at the time of the injury. Did not fracture finger or have ligament or tendon damage. Denies warmth, redness, pain or discharge. Negative for fever chills. One stitch came out immediately after placement and he has been working to knit this back together by taping it. Denies numbness or tingling but does note because he wore bulky dressing the finger was somewhat hyperextended.    Also notes his right palm is a little sore from gripping with it instead of using thumb.   He is a  by [employer].     Problem, Medication and Allergy Lists were reviewed and updated if needed.   There are no problems to display for this patient.        Current Outpatient Medications   Medication Sig Dispense Refill    buPROPion (WELLBUTRIN XL) 150 MG 24 hr tablet TAKE 1 TABLET BY MOUTH DAILY (WITH 300 MG FOR A TOTAL  MG)      buPROPion (WELLBUTRIN XL) 300 MG 24 hr tablet       DULoxetine (CYMBALTA) 60 MG capsule Take 60 mg by mouth daily      gabapentin (NEURONTIN) 100 MG capsule       hydrOXYzine (ATARAX) 25 MG tablet AS DIRECTED 1TAB IN AM OR AFTERNOON AS NEEDED FOR ANXIETY &2-4TABS AT BEDTIME AS NEEDED FOR INSOMNIA      multivitamin w/minerals (MULTI-VITAMIN) tablet Take 1 tablet by mouth daily      traZODone (DESYREL) 100 MG tablet TAKE 1 TO 2 TABLETS BY MOUTH AT BEDTIME AS NEEDED FOR INSOMNIA      traZODone (DESYREL) 50 MG tablet       ARIPiprazole (ABILIFY) 5 MG tablet  (Patient not taking: Reported on 8/22/2023)      cholecalciferol (VITAMIN D3) 125 mcg (5000 units) capsule  (Patient not taking: Reported on 8/22/2023)           Allergies   Allergen Reactions    Ibuprofen Rash  "  .    Patient is a new patient to this clinic and so  I reviewed/updated the Past Medical History, the Family History and the Social History   Past Medical History:   Diagnosis Date    Laceration of right index finger 08/11/2023     History reviewed. No pertinent family history.  Social History     Socioeconomic History    Marital status: Single     Spouse name: None    Number of children: None    Years of education: None    Highest education level: None   Tobacco Use    Smoking status: Never    Smokeless tobacco: Never   Vaping Use    Vaping Use: Never used   Substance and Sexual Activity    Drug use: Not Currently   .         Review of Systems:   Review of Systems  GEN: no fever chills  MSK: as per HPI: slight stiffness right index finger  NEURO: negative numbness or tingling  DERM: as per HPI.       Physical Exam:     Vitals:    08/22/23 1521   BP: (!) 156/79   Pulse: 100   Temp: 98.2  F (36.8  C)   TempSrc: Oral   SpO2: 97%   Weight: 87.5 kg (193 lb)   Height: 1.727 m (5' 8\")     Body mass index is 29.35 kg/m .  Vital signs normal except BP elevated. Reports this is not usual for him   Physical Exam  GEN: alert male in NAD  SKIN: Wound is clean and dry; slight edema right index finger. 5 sutures over flexor surface proximal joint right index finger. 4 of the sutures are intact but the 5th 2nd one in from medial edge is just stitched into lateral surface and does not cross the laceration. That area of laceration (2mm) is not approximated and open 1mm;  pink and shows scant serous discharge.  No warmth, redness. 5 sutures removed without incident. Steri strip applied and redressed  MSK/NEURO: Able to flex finger almost fully.  CMS intact. Able to fully  with hand.       Results:   No testing ordered today    Assessment and Plan        1. Visit for suture removal  Sutures removed as above  Instruction on wound care, use of steri strips, when to seek care provided. To seek care if wound not closing or become " infected. Cover at work, clean gently daily and leave open to air at night if home.   Recheck BP at later time.   Briefly discussed exposure to metals.          There are no discontinued medications.    Options for treatment and follow-up care were reviewed with the patient. Adan Britton  engaged in the decision making process and verbalized understanding of the options discussed and agreed with the final plan.    MEERA Barbosa CNP

## 2023-08-22 NOTE — PATIENT INSTRUCTIONS
Wound management  Keep area covered at work, allow for some flexibility  When home, remove dressing, gently cleanse and dry.  Do gentle stretches, but the goal is to get the skin to knit together. This will look like a scar.  Watch for signs of infection: redness, swelling, warmth, pus drainage.    Seek care if not healing and/or develop pain, loss of movement.

## 2023-08-22 NOTE — NURSING NOTE
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      Blood pressure (!) 156/79, pulse 100, temperature 98.2  F (36.8  C), temperature source Oral, height 1.727 m (5' 8\"), weight 87.5 kg (193 lb), SpO2 97 %. Body mass index is 29.35 kg/m .  BP completed using cuff size:        There is no problem list on file for this patient.      Wt Readings from Last 2 Encounters:   08/22/23 87.5 kg (193 lb)   07/18/23 81.6 kg (180 lb)     BP Readings from Last 3 Encounters:   08/22/23 (!) 156/79   05/15/23 115/71       Allergies   Allergen Reactions     Ibuprofen Rash       Current Outpatient Medications   Medication     buPROPion (WELLBUTRIN XL) 150 MG 24 hr tablet     buPROPion (WELLBUTRIN XL) 300 MG 24 hr tablet     DULoxetine (CYMBALTA) 60 MG capsule     gabapentin (NEURONTIN) 100 MG capsule     hydrOXYzine (ATARAX) 25 MG tablet     multivitamin w/minerals (MULTI-VITAMIN) tablet     traZODone (DESYREL) 100 MG tablet     traZODone (DESYREL) 50 MG tablet     ARIPiprazole (ABILIFY) 5 MG tablet     cholecalciferol (VITAMIN D3) 125 mcg (5000 units) capsule     No current facility-administered medications for this visit.       Social History     Tobacco Use     Smoking status: Never     Smokeless tobacco: Never   Vaping Use     Vaping Use: Never used       Honoring Choices - Health Care Directive Guide offered to patient at time of visit.    Health Maintenance Due   Topic Date Due     YEARLY PREVENTIVE VISIT  Never done     ADVANCE CARE PLANNING  Never done     DEPRESSION ACTION PLAN  Never done     PHQ-9  Never done     HIV SCREENING  Never done     HEPATITIS C SCREENING  Never done       Immunization History   Administered Date(s) Administered     COVID-19 Bivalent 12+ (Pfizer) 09/09/2022     COVID-19 MONOVALENT 12+ (Pfizer) 04/09/2021, 04/30/2021       No results found for: PAP    No lab results found.         No data to " display                     No data to display                     No data to display                     No data to display                Sam Calderón    August 22, 2023 3:23 PM

## 2023-12-25 ENCOUNTER — NURSE TRIAGE (OUTPATIENT)
Dept: NURSING | Facility: CLINIC | Age: 28
End: 2023-12-25
Payer: COMMERCIAL

## 2023-12-25 NOTE — TELEPHONE ENCOUNTER
"COVID Positive/Requesting COVID treatment    Patient is positive for COVID and requesting treatment options.    Date of positive COVID test (PCR or at home)? 12/25/23  Current COVID symptoms: cough, fatigue, muscle or body aches, headache, new loss of taste or smell, sore throat, congestion or runny nose, and diarrhea  Date COVID symptoms began: 12/22/23    Pt reports nausea was first symptom on 12/22/23, now resolved.  \"Feeling a little dizzy.\"  Reports drinking generous fluids.  Pt rates current headache pain 2/10.  Mild chills and night sweats the past few days.  No thermometer available.  Has not tried Tylenol or any other home care measures.  Discussed warm fluids, plain otc Mucinex to relieve nasal congestion, cough.    Pt reports being \"overweight, suffering from depression.\"  Hopes he could qualify for Paxlovid.  Advised pt to call his [facility] upon open hours tomorrow (Dec 26th) for purposes of seeking Paxlovid.  Pt verbalizes understanding.  Agrees to do so.    Jazmín POTTER [facility] Nurse Advisor     Reason for Disposition   [1] HIGH RISK patient (e.g., weak immune system, age > 64 years, obesity with BMI 30 or higher, pregnant, chronic lung disease or other chronic medical condition) AND [2] COVID symptoms (e.g., cough, fever)  (Exceptions: Already seen by PCP and no new or worsening symptoms.)    Additional Information   Negative: SEVERE difficulty breathing (e.g., struggling for each breath, speaks in single words)   Negative: Difficult to awaken or acting confused (e.g., disoriented, slurred speech)   Negative: Bluish (or gray) lips or face now   Negative: Shock suspected (e.g., cold/pale/clammy skin, too weak to stand, low BP, rapid pulse)   Negative: Sounds like a life-threatening emergency to the triager   Negative: [1] Diagnosed or suspected COVID-19 AND [2] symptoms lasting 3 or more weeks   Negative: [1] COVID-19 exposure AND [2] no symptoms   Negative: COVID-19 vaccine reaction suspected " (e.g., fever, headache, muscle aches) occurring 1 to 3 days after getting vaccine   Negative: COVID-19 vaccine, questions about   Negative: [1] Lives with someone known to have influenza (flu test positive) AND [2] flu-like symptoms (e.g., cough, runny nose, sore throat, SOB; with or without fever)   Negative: [1] Possible COVID-19 symptoms AND [2] triager concerned about severity of symptoms or other causes   Negative: COVID-19 and breastfeeding, questions about   Negative: SEVERE or constant chest pain or pressure  (Exception: Mild central chest pain, present only when coughing.)   Negative: MODERATE difficulty breathing (e.g., speaks in phrases, SOB even at rest, pulse 100-120)   Negative: [1] Headache AND [2] stiff neck (can't touch chin to chest)   Negative: Oxygen level (e.g., pulse oximetry) 90 percent or lower   Negative: Chest pain or pressure  (Exception: MILD central chest pain, present only when coughing.)   Negative: [1] Drinking very little AND [2] dehydration suspected (e.g., no urine > 12 hours, very dry mouth, very lightheaded)   Negative: Patient sounds very sick or weak to the triager   Negative: MILD difficulty breathing (e.g., minimal/no SOB at rest, SOB with walking, pulse <100)   Negative: Fever > 103 F (39.4 C)   Negative: [1] Fever > 101 F (38.3 C) AND [2] age > 60 years   Negative: [1] Fever > 100.0 F (37.8 C) AND [2] bedridden (e.g., CVA, chronic illness, recovering from surgery)   Negative: Oxygen level (e.g., pulse oximetry) 91 to 94 percent    Protocols used: Coronavirus (COVID-19) Diagnosed or [id number]

## 2024-03-09 ENCOUNTER — HEALTH MAINTENANCE LETTER (OUTPATIENT)
Age: 29
End: 2024-03-09

## 2024-05-24 ENCOUNTER — OFFICE VISIT (OUTPATIENT)
Dept: FAMILY MEDICINE | Facility: CLINIC | Age: 29
End: 2024-05-24
Payer: COMMERCIAL

## 2024-05-24 VITALS
SYSTOLIC BLOOD PRESSURE: 127 MMHG | WEIGHT: 194.9 LBS | OXYGEN SATURATION: 96 % | RESPIRATION RATE: 18 BRPM | TEMPERATURE: 98.6 F | HEIGHT: 69 IN | HEART RATE: 91 BPM | DIASTOLIC BLOOD PRESSURE: 77 MMHG | BODY MASS INDEX: 28.87 KG/M2

## 2024-05-24 DIAGNOSIS — R53.83 OTHER FATIGUE: Primary | ICD-10-CM

## 2024-05-24 DIAGNOSIS — G47.10 SLEEPING EXCESSIVE: ICD-10-CM

## 2024-05-24 DIAGNOSIS — R68.82 DECREASED LIBIDO: ICD-10-CM

## 2024-05-24 DIAGNOSIS — M65.351 TRIGGER FINGER, RIGHT LITTLE FINGER: ICD-10-CM

## 2024-05-24 DIAGNOSIS — H61.21 IMPACTED CERUMEN OF RIGHT EAR: ICD-10-CM

## 2024-05-24 DIAGNOSIS — R79.0 LOW FERRITIN LEVEL: ICD-10-CM

## 2024-05-24 LAB
BASOPHILS # BLD AUTO: 0 10E3/UL (ref 0–0.2)
BASOPHILS NFR BLD AUTO: 1 %
EOSINOPHIL # BLD AUTO: 0.1 10E3/UL (ref 0–0.7)
EOSINOPHIL NFR BLD AUTO: 2 %
ERYTHROCYTE [DISTWIDTH] IN BLOOD BY AUTOMATED COUNT: 12.9 % (ref 10–15)
HBA1C MFR BLD: 5.7 %
HCT VFR BLD AUTO: 45.6 % (ref 40–53)
HGB BLD-MCNC: 14.9 G/DL (ref 13.3–17.7)
IMM GRANULOCYTES # BLD: 0 10E3/UL
IMM GRANULOCYTES NFR BLD: 0 %
LYMPHOCYTES # BLD AUTO: 1.1 10E3/UL (ref 0.8–5.3)
LYMPHOCYTES NFR BLD AUTO: 28 %
MCH RBC QN AUTO: 28.3 PG (ref 26.5–33)
MCHC RBC AUTO-ENTMCNC: 32.7 G/DL (ref 31.5–36.5)
MCV RBC AUTO: 87 FL (ref 78–100)
MONOCYTES # BLD AUTO: 0.4 10E3/UL (ref 0–1.3)
MONOCYTES NFR BLD AUTO: 9 %
NEUTROPHILS # BLD AUTO: 2.4 10E3/UL (ref 1.6–8.3)
NEUTROPHILS NFR BLD AUTO: 60 %
NRBC # BLD AUTO: 0 10E3/UL
NRBC BLD AUTO-RTO: 0 /100
PLATELET # BLD AUTO: 258 10E3/UL (ref 150–450)
RBC # BLD AUTO: 5.26 10E6/UL (ref 4.4–5.9)
WBC # BLD AUTO: 4 10E3/UL (ref 4–11)

## 2024-05-24 PROCEDURE — 82728 ASSAY OF FERRITIN: CPT | Mod: ORL | Performed by: NURSE PRACTITIONER

## 2024-05-24 PROCEDURE — 84443 ASSAY THYROID STIM HORMONE: CPT | Mod: ORL | Performed by: NURSE PRACTITIONER

## 2024-05-24 PROCEDURE — 85025 COMPLETE CBC W/AUTO DIFF WBC: CPT | Mod: ORL | Performed by: NURSE PRACTITIONER

## 2024-05-24 PROCEDURE — 83550 IRON BINDING TEST: CPT | Mod: ORL | Performed by: NURSE PRACTITIONER

## 2024-05-24 PROCEDURE — 80053 COMPREHEN METABOLIC PANEL: CPT | Mod: ORL | Performed by: NURSE PRACTITIONER

## 2024-05-24 PROCEDURE — 83036 HEMOGLOBIN GLYCOSYLATED A1C: CPT | Mod: ORL | Performed by: NURSE PRACTITIONER

## 2024-05-24 RX ORDER — FERROUS GLUCONATE 324(38)MG
324 TABLET ORAL PRN
COMMUNITY
End: 2024-09-16

## 2024-05-24 SDOH — HEALTH STABILITY: PHYSICAL HEALTH: ON AVERAGE, HOW MANY DAYS PER WEEK DO YOU ENGAGE IN MODERATE TO STRENUOUS EXERCISE (LIKE A BRISK WALK)?: 2 DAYS

## 2024-05-24 ASSESSMENT — ANXIETY QUESTIONNAIRES
2. NOT BEING ABLE TO STOP OR CONTROL WORRYING: NOT AT ALL
5. BEING SO RESTLESS THAT IT IS HARD TO SIT STILL: NOT AT ALL
GAD7 TOTAL SCORE: 1
GAD7 TOTAL SCORE: 1
8. IF YOU CHECKED OFF ANY PROBLEMS, HOW DIFFICULT HAVE THESE MADE IT FOR YOU TO DO YOUR WORK, TAKE CARE OF THINGS AT HOME, OR GET ALONG WITH OTHER PEOPLE?: NOT DIFFICULT AT ALL
GAD7 TOTAL SCORE: 1
3. WORRYING TOO MUCH ABOUT DIFFERENT THINGS: NOT AT ALL
1. FEELING NERVOUS, ANXIOUS, OR ON EDGE: SEVERAL DAYS
IF YOU CHECKED OFF ANY PROBLEMS ON THIS QUESTIONNAIRE, HOW DIFFICULT HAVE THESE PROBLEMS MADE IT FOR YOU TO DO YOUR WORK, TAKE CARE OF THINGS AT HOME, OR GET ALONG WITH OTHER PEOPLE: NOT DIFFICULT AT ALL
7. FEELING AFRAID AS IF SOMETHING AWFUL MIGHT HAPPEN: NOT AT ALL
7. FEELING AFRAID AS IF SOMETHING AWFUL MIGHT HAPPEN: NOT AT ALL
4. TROUBLE RELAXING: NOT AT ALL
6. BECOMING EASILY ANNOYED OR IRRITABLE: NOT AT ALL

## 2024-05-24 ASSESSMENT — ENCOUNTER SYMPTOMS
SORE THROAT: 0
COUGH: 0
FATIGUE: 1
CHILLS: 0
SHORTNESS OF BREATH: 0
FEVER: 0

## 2024-05-24 ASSESSMENT — PAIN SCALES - GENERAL: PAINLEVEL: NO PAIN (0)

## 2024-05-24 ASSESSMENT — PATIENT HEALTH QUESTIONNAIRE - PHQ9
10. IF YOU CHECKED OFF ANY PROBLEMS, HOW DIFFICULT HAVE THESE PROBLEMS MADE IT FOR YOU TO DO YOUR WORK, TAKE CARE OF THINGS AT HOME, OR GET ALONG WITH OTHER PEOPLE: SOMEWHAT DIFFICULT
SUM OF ALL RESPONSES TO PHQ QUESTIONS 1-9: 6
SUM OF ALL RESPONSES TO PHQ QUESTIONS 1-9: 6

## 2024-05-24 ASSESSMENT — SOCIAL DETERMINANTS OF HEALTH (SDOH): HOW OFTEN DO YOU GET TOGETHER WITH FRIENDS OR RELATIVES?: TWICE A WEEK

## 2024-05-24 NOTE — NURSING NOTE
"ROOM:2  DONG MARADIAGA    Preferred Name: Kranthi     How did you hear about us?  Current Patient     Services Provided? No    28 year old  Chief Complaint   Patient presents with    Physical     Patient reports feeling fatigue and over sleeping. Patient also reports mild trigger finger (last finger on right hand). Patient also reports having Major Depressive Disorder        Blood pressure 127/77, pulse 91, temperature 98.6  F (37  C), temperature source Oral, resp. rate 18, height 1.75 m (5' 8.9\"), weight 88.4 kg (194 lb 14.4 oz), SpO2 96%. Body mass index is 28.87 kg/m .  BP completed using cuff size:        There is no problem list on file for this patient.      Wt Readings from Last 2 Encounters:   05/24/24 88.4 kg (194 lb 14.4 oz)   08/22/23 87.5 kg (193 lb)     BP Readings from Last 3 Encounters:   05/24/24 127/77   08/22/23 (!) 156/79   05/15/23 115/71       Allergies   Allergen Reactions    Ibuprofen Rash       Current Outpatient Medications   Medication Sig Dispense Refill    ARIPiprazole (ABILIFY) 5 MG tablet Take 5 mg by mouth daily      buPROPion (WELLBUTRIN XL) 150 MG 24 hr tablet TAKE 1 TABLET BY MOUTH DAILY (WITH 300 MG FOR A TOTAL  MG)      buPROPion (WELLBUTRIN XL) 300 MG 24 hr tablet       cholecalciferol (VITAMIN D3) 125 mcg (5000 units) capsule       DULoxetine (CYMBALTA) 60 MG capsule Take 60 mg by mouth daily      ferrous gluconate (FERGON) 324 (38 Fe) MG tablet Take 324 mg by mouth as needed      hydrOXYzine (ATARAX) 25 MG tablet AS DIRECTED 1TAB IN AM OR AFTERNOON AS NEEDED FOR ANXIETY &2-4TABS AT BEDTIME AS NEEDED FOR INSOMNIA      multivitamin w/minerals (MULTI-VITAMIN) tablet Take 1 tablet by mouth daily      traZODone (DESYREL) 100 MG tablet TAKE 1 TO 2 TABLETS BY MOUTH AT BEDTIME AS NEEDED FOR INSOMNIA      gabapentin (NEURONTIN) 100 MG capsule  (Patient not taking: Reported on 5/24/2024)      traZODone (DESYREL) 50 MG tablet  (Patient not taking: Reported on " "5/24/2024)       No current facility-administered medications for this visit.       Social History     Tobacco Use    Smoking status: Never     Passive exposure: Current    Smokeless tobacco: Never   Vaping Use    Vaping status: Never Used   Substance Use Topics    Alcohol use: Yes     Comment: 1-2 drinks a week    Drug use: Not Currently     Types: Marijuana       Honoring Choices - Health Care Directive Guide offered to patient at time of visit.    Health Maintenance Due   Topic Date Due    YEARLY PREVENTIVE VISIT  Never done    ADVANCE CARE PLANNING  Never done    DEPRESSION ACTION PLAN  Never done    HIV SCREENING  Never done    HEPATITIS C SCREENING  Never done       Immunization History   Administered Date(s) Administered    COVID-19 12+ (2023-24) (Pfizer) 10/28/2023    COVID-19 Bivalent 12+ (Pfizer) 09/09/2022    COVID-19 MONOVALENT 12+ (Pfizer) 04/09/2021, 04/30/2021, 12/04/2021       No results found for: \"PAP\"    No lab results found.         No data to display                    5/24/2024     2:05 PM   PHQ-9 SCORE   PHQ-9 Total Score MyChart 6 (Mild depression)   PHQ-9 Total Score 6           5/24/2024     2:06 PM   RONALD-7 SCORE   Total Score 1 (minimal anxiety)   Total Score 1            No data to display                Darci Ghosh    May 24, 2024 2:26 PM    "

## 2024-05-24 NOTE — PROGRESS NOTES
Today's Date: May 24, 2024     Patient Adan Britton 1995 presents to the clinic today to address   Chief Complaint   Patient presents with    Physical     Patient reports feeling fatigue and over sleeping. Patient also reports mild trigger finger (last finger on right hand). Patient also reports having Major Depressive Disorder              SUBJECTIVE     History of Present Illness:      28-year-old male with past medical history of MDD presents to clinic to discuss a few concerns.  Patient initially scheduled physical, however, he was advised of the possibility of double billing if we address numerous concerns as well as provide a preventative exam.  After our discussion, patient opted to just address his concerns today.    Fatigue/oversleeping-patient reports that he has had issues with oversleeping for years, however, he has noticed over the past few months that he has been sleeping more.  There are some days where he can sleep on and off for 24 hours.  He slept 14 hours last night. He denies recent fever, chills, sore throat.  He denies known mono exposure.  Associated symptoms include fatigue, low sex drive, weight gain, difficulty maintaining erection.  He has a new partner, he is able to have intercourse and he denies performance anxiety.  He has a history of mild ANTONIO, he is not currently using slumber bump.  He exercises 2-3 times per week.  He denies falling asleep on the job (works as a ).  He awakens tired.  Of note, he has been donating blood every 8 weeks since December 2023.  He will take iron for a few days after each donation.  He tried decreasing trazodone, however, he then had interrupted sleep.  He follows Geeta Blum, psych NP for medication management as well as a therapist.      Right pinky trigger finger-patient reports chronic right pinky trigger finger.  He works as a  as noted above.  He has difficulty extending his right pinky and notes a popping sensation.  He denies  pain.  He tried a splint which was not helpful.  He has not tried any medications for this concern. No other acute concerns/symptoms at time of exam.            Review of Systems   Constitutional:  Positive for fatigue. Negative for chills and fever.   HENT:  Negative for sore throat.    Respiratory:  Negative for cough and shortness of breath.    Cardiovascular:  Negative for chest pain.   Constitutional, HEENT, cardiovascular, pulmonary, gi and gu systems are negative, except as otherwise noted.      Allergies   Allergen Reactions    Ibuprofen Rash      Current Outpatient Medications   Medication Sig Dispense Refill    ARIPiprazole (ABILIFY) 5 MG tablet Take 5 mg by mouth daily      buPROPion (WELLBUTRIN XL) 150 MG 24 hr tablet TAKE 1 TABLET BY MOUTH DAILY (WITH 300 MG FOR A TOTAL  MG)      buPROPion (WELLBUTRIN XL) 300 MG 24 hr tablet       cholecalciferol (VITAMIN D3) 125 mcg (5000 units) capsule       DULoxetine (CYMBALTA) 60 MG capsule Take 60 mg by mouth daily      ferrous gluconate (FERGON) 324 (38 Fe) MG tablet Take 324 mg by mouth as needed      hydrOXYzine (ATARAX) 25 MG tablet AS DIRECTED 1TAB IN AM OR AFTERNOON AS NEEDED FOR ANXIETY &2-4TABS AT BEDTIME AS NEEDED FOR INSOMNIA      multivitamin w/minerals (MULTI-VITAMIN) tablet Take 1 tablet by mouth daily      traZODone (DESYREL) 100 MG tablet TAKE 1 TO 2 TABLETS BY MOUTH AT BEDTIME AS NEEDED FOR INSOMNIA       No current facility-administered medications for this visit.           Past Medical History:   Diagnosis Date    Depressive disorder     Laceration of right index finger 08/11/2023        Family History   Problem Relation Age of Onset    Hypertension Mother     Anxiety Disorder Father     Narcolepsy Father     Cancer Paternal Grandfather     Cancer Paternal Uncle         Social History     Tobacco Use    Smoking status: Never     Passive exposure: Current    Smokeless tobacco: Never   Vaping Use    Vaping status: Never Used   Substance Use  "Topics    Alcohol use: Yes     Comment: 1-2 drinks a week    Drug use: Not Currently     Types: Marijuana        History   Sexual Activity    Sexual activity: Yes    Partners: Female    Birth control/ protection: Condom            5/24/2024     2:05 PM   PHQ   PHQ-9 Total Score 6   Q9: Thoughts of better off dead/self-harm past 2 weeks Not at all        Immunization History   Administered Date(s) Administered    COVID-19 12+ (2023-24) (Pfizer) 10/28/2023    COVID-19 Bivalent 12+ (Pfizer) 09/09/2022    COVID-19 MONOVALENT 12+ (Pfizer) 04/09/2021, 04/30/2021, 12/04/2021                 OBJECTIVE     /77 (BP Location: Left arm, Patient Position: Sitting, Cuff Size: Adult Regular)   Pulse 91   Temp 98.6  F (37  C) (Oral)   Resp 18   Ht 1.75 m (5' 8.9\")   Wt 88.4 kg (194 lb 14.4 oz)   SpO2 96%   BMI 28.87 kg/m       Labs:  No results found for: \"WBC\", \"HGB\", \"HCT\", \"PLT\", \"CHOL\", \"TRIG\", \"HDL\", \"LDLDIRECT\", \"ALT\", \"AST\", \"NA\", \"CREATININE\", \"BUN\", \"CO2\", \"TSH\", \"PSA\", \"INR\", \"GLUF\", \"HGBA1C\", \"MICROALBUR\"     Physical Exam  Constitutional:       General: He is not in acute distress.     Appearance: He is not ill-appearing.   HENT:      Right Ear: Tympanic membrane is not erythematous or bulging.      Left Ear: Tympanic membrane is not erythematous or bulging.      Ears:      Comments: Loose cerumen noted in bilateral EACs.  Large piece of cerumen removed manually with oval curette in distal R EAC. Patient tolerated well without complication.  Obscured TM landmarks noted bilaterally.     Nose: No rhinorrhea.      Mouth/Throat:      Pharynx: Oropharynx is clear.   Eyes:      Extraocular Movements: Extraocular movements intact.      Pupils: Pupils are equal, round, and reactive to light.   Cardiovascular:      Rate and Rhythm: Normal rate and regular rhythm.      Heart sounds: Normal heart sounds. No murmur heard.  Pulmonary:      Effort: Pulmonary effort is normal. No respiratory distress.      Breath sounds: " Normal breath sounds. No wheezing or rales.   Abdominal:      General: Bowel sounds are normal.      Palpations: Abdomen is soft.      Tenderness: There is no abdominal tenderness.   Musculoskeletal:      Right hand: No swelling. Normal range of motion. Normal strength.      Left hand: Normal range of motion. Normal strength.      Cervical back: Neck supple.      Right lower leg: No edema.      Left lower leg: No edema.      Comments: Clicking sensation noted with R 5th digit extension.   Lymphadenopathy:      Cervical: No cervical adenopathy.   Neurological:      General: No focal deficit present.      Mental Status: He is alert.   Psychiatric:         Thought Content: Thought content normal.         Judgment: Judgment normal.               ASSESSMENT/PLAN     1. Other fatigue    This is a pleasant 28-year-old male with past medical history significant for MDD and ANTONIO who presents to discuss fatigue and oversleeping.  Patient's vitals are stable, he is in no acute distress.    Symptoms likely multifactorial in the setting of underlying MDD, ANTONIO, with voluntary blood donations every 8 weeks.  Will check labs as noted below including testosterone considering his decreased libido.  Advised patient he will likely have to start taking iron every other day on a chronic basis.  He may need to follow-up with sleep medicine to discuss starting CPAP.  Will defer psych management to his mental health care team.    - Testosterone total; Future  - CBC with platelets differential  - Comprehensive metabolic panel  - Hemoglobin A1c  - TSH with free T4 reflex  - Ferritin  - Iron and iron binding capacity    2. Sleeping excessive  As noted above.  - TSH with free T4 reflex    3. Decreased libido  As noted above.  - Testosterone total; Future  - CBC with platelets differential  - TSH with free T4 reflex    4. Impacted cerumen of right ear    Removed R EAC cerumen with oval curette without complication.  Patient with obscured TM  landmarks noted bilaterally.  He denies hearing concerns.  He wears earplugs for his work as a .  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 Offered audiology referral which he respectfully declined at this time.    - carbamide peroxide (DEBROX) 6.5 % otic solution; Place 5 drops into both ears daily as needed for other  Dispense: 15 mL; Refill: 0    5. Trigger finger, right little finger  Patient has tried splint which is was not helpful.  Offered OT referral which patient respectfully declined.  Provided patient with printout of trigger finger exercises.  Follow-up if symptoms worsen/fail improve.      Education provided on at-home supportive measures including range of motion exercises.    Follow-Up:  - Follow up in as needed, or if symptoms worsen or fail to improve. Disposition pending results.      Options for treatment and follow-up care were reviewed with the patient. Patient engaged in the decision making process and verbalized understanding of the options discussed and agreed with the final plan.  AVS printed and given to patient.    MEERA [facility]  [address] [place] [id number]  [phone]        Note: Chart documentation was done in part with Dragon Voice Recognition software.  Although reviewed after completion, some word and grammatical errors may remain. Please contact author for any clarification or concerns.

## 2024-05-25 LAB
ALBUMIN SERPL BCG-MCNC: 4.7 G/DL (ref 3.5–5.2)
ALP SERPL-CCNC: 63 U/L (ref 40–150)
ALT SERPL W P-5'-P-CCNC: 77 U/L (ref 0–70)
ANION GAP SERPL CALCULATED.3IONS-SCNC: 13 MMOL/L (ref 7–15)
AST SERPL W P-5'-P-CCNC: 39 U/L (ref 0–45)
BILIRUB SERPL-MCNC: 0.3 MG/DL
BUN SERPL-MCNC: 13.3 MG/DL (ref 6–20)
CALCIUM SERPL-MCNC: 9.5 MG/DL (ref 8.6–10)
CHLORIDE SERPL-SCNC: 106 MMOL/L (ref 98–107)
CREAT SERPL-MCNC: 1.23 MG/DL (ref 0.67–1.17)
DEPRECATED HCO3 PLAS-SCNC: 22 MMOL/L (ref 22–29)
EGFRCR SERPLBLD CKD-EPI 2021: 82 ML/MIN/1.73M2
FERRITIN SERPL-MCNC: 21 NG/ML (ref 31–409)
GLUCOSE SERPL-MCNC: 93 MG/DL (ref 70–99)
IRON BINDING CAPACITY (ROCHE): 325 UG/DL (ref 240–430)
IRON SATN MFR SERPL: 33 % (ref 15–46)
IRON SERPL-MCNC: 108 UG/DL (ref 61–157)
POTASSIUM SERPL-SCNC: 4.4 MMOL/L (ref 3.4–5.3)
PROT SERPL-MCNC: 7.1 G/DL (ref 6.4–8.3)
SODIUM SERPL-SCNC: 141 MMOL/L (ref 135–145)
TSH SERPL DL<=0.005 MIU/L-ACNC: 0.54 UIU/ML (ref 0.3–4.2)

## 2024-05-25 RX ORDER — FERROUS SULFATE 325(65) MG
325 TABLET ORAL EVERY OTHER DAY
Qty: 60 TABLET | Refills: 1 | Status: SHIPPED | OUTPATIENT
Start: 2024-05-25 | End: 2024-09-19

## 2024-05-31 ENCOUNTER — LAB (OUTPATIENT)
Dept: LAB | Facility: CLINIC | Age: 29
End: 2024-05-31
Payer: COMMERCIAL

## 2024-05-31 DIAGNOSIS — R53.83 OTHER FATIGUE: ICD-10-CM

## 2024-05-31 DIAGNOSIS — R68.82 DECREASED LIBIDO: ICD-10-CM

## 2024-05-31 PROCEDURE — 84403 ASSAY OF TOTAL TESTOSTERONE: CPT | Mod: ORL | Performed by: NURSE PRACTITIONER

## 2024-06-04 LAB — TESTOST SERPL-MCNC: 388 NG/DL (ref 240–950)

## 2024-09-16 ENCOUNTER — OFFICE VISIT (OUTPATIENT)
Dept: FAMILY MEDICINE | Facility: CLINIC | Age: 29
End: 2024-09-16
Payer: COMMERCIAL

## 2024-09-16 VITALS
OXYGEN SATURATION: 97 % | HEART RATE: 105 BPM | BODY MASS INDEX: 31.49 KG/M2 | TEMPERATURE: 98.2 F | HEIGHT: 68 IN | RESPIRATION RATE: 18 BRPM | DIASTOLIC BLOOD PRESSURE: 84 MMHG | WEIGHT: 207.8 LBS | SYSTOLIC BLOOD PRESSURE: 149 MMHG

## 2024-09-16 DIAGNOSIS — R79.0 LOW FERRITIN: ICD-10-CM

## 2024-09-16 DIAGNOSIS — R03.0 ELEVATED BLOOD PRESSURE READING WITHOUT DIAGNOSIS OF HYPERTENSION: ICD-10-CM

## 2024-09-16 DIAGNOSIS — H73.93 DISORDER OF TYMPANIC MEMBRANE OF BOTH EARS: ICD-10-CM

## 2024-09-16 DIAGNOSIS — R73.03 PREDIABETES: Primary | ICD-10-CM

## 2024-09-16 DIAGNOSIS — Z86.39 HISTORY OF HYPERLIPIDEMIA: ICD-10-CM

## 2024-09-16 LAB — HBA1C MFR BLD: 5.6 %

## 2024-09-16 PROCEDURE — 82728 ASSAY OF FERRITIN: CPT | Mod: ORL | Performed by: NURSE PRACTITIONER

## 2024-09-16 PROCEDURE — 80061 LIPID PANEL: CPT | Mod: ORL | Performed by: NURSE PRACTITIONER

## 2024-09-16 PROCEDURE — 83036 HEMOGLOBIN GLYCOSYLATED A1C: CPT | Mod: ORL | Performed by: NURSE PRACTITIONER

## 2024-09-16 RX ORDER — DIPHENHYD/PHENYLEPH/ACETAMINOP 12.5-5-325
1 TABLET ORAL 2 TIMES DAILY
Qty: 1 KIT | Refills: 1 | Status: SHIPPED | OUTPATIENT
Start: 2024-09-16

## 2024-09-16 RX ORDER — MULTIVIT,TX WITH IRON,MINERALS
400 TABLET, EXTENDED RELEASE ORAL DAILY
COMMUNITY
Start: 2024-08-21

## 2024-09-16 RX ORDER — MAGNESIUM OXIDE 400 MG/1
400 TABLET ORAL DAILY
Qty: 60 TABLET | Refills: 3 | Status: SHIPPED | OUTPATIENT
Start: 2024-09-16

## 2024-09-16 RX ORDER — LAMOTRIGINE 100 MG/1
100 TABLET ORAL DAILY
COMMUNITY
Start: 2024-08-21

## 2024-09-16 ASSESSMENT — PAIN SCALES - GENERAL: PAINLEVEL: NO PAIN (1)

## 2024-09-16 NOTE — NURSING NOTE
"ROOM:2  [facility]    Preferred Name: Kranthi     Social Determinants of Health   SDoH screening reviewed today: Yes     Services Provided? No    28 year old  Chief Complaint   Patient presents with    Hypertension    iron levels     Dental Pain       Blood pressure 131/82, pulse 105, temperature 98.2  F (36.8  C), temperature source Oral, resp. rate 18, height 1.717 m (5' 7.6\"), weight 94.3 kg (207 lb 12.8 oz), SpO2 97%. Body mass index is 31.97 kg/m .  BP completed using cuff size:        There is no problem list on file for this patient.      Wt Readings from Last 2 Encounters:   09/16/24 94.3 kg (207 lb 12.8 oz)   05/24/24 88.4 kg (194 lb 14.4 oz)     BP Readings from Last 3 Encounters:   09/16/24 131/82   05/24/24 127/77   08/22/23 (!) 156/79       Allergies   Allergen Reactions    Ibuprofen Rash       Current Outpatient Medications   Medication Sig Dispense Refill    ARIPiprazole (ABILIFY) 5 MG tablet Take 5 mg by mouth daily      buPROPion (WELLBUTRIN XL) 150 MG 24 hr tablet TAKE 1 TABLET BY MOUTH DAILY (WITH 300 MG FOR A TOTAL  MG)      buPROPion (WELLBUTRIN XL) 300 MG 24 hr tablet       cholecalciferol (VITAMIN D3) 125 mcg (5000 units) capsule       DULoxetine (CYMBALTA) 60 MG capsule Take 60 mg by mouth daily      ferrous sulfate (FEROSUL) 325 (65 Fe) MG tablet Take 1 tablet (325 mg) by mouth every other day 60 tablet 1    hydrOXYzine (ATARAX) 25 MG tablet AS DIRECTED 1TAB IN AM OR AFTERNOON AS NEEDED FOR ANXIETY &2-4TABS AT BEDTIME AS NEEDED FOR INSOMNIA      lamoTRIgine (LAMICTAL) 100 MG tablet Take 100 mg by mouth daily.      magnesium gluconate (MAGONATE) 250 MG tablet Take 400 mg by mouth daily.      metFORMIN (GLUCOPHAGE) 500 MG tablet Take 500 mg by mouth daily (with breakfast).      multivitamin w/minerals (MULTI-VITAMIN) tablet Take 1 tablet by mouth daily      traZODone (DESYREL) 100 MG tablet TAKE 1 TO 2 TABLETS BY MOUTH AT BEDTIME AS NEEDED FOR INSOMNIA      carbamide " "peroxide (DEBROX) 6.5 % otic solution Place 5 drops into both ears daily as needed for other (Patient not taking: Reported on 9/16/2024) 15 mL 0    ferrous gluconate (FERGON) 324 (38 Fe) MG tablet Take 324 mg by mouth as needed (Patient not taking: Reported on 9/16/2024)       No current facility-administered medications for this visit.       Social History     Tobacco Use    Smoking status: Never     Passive exposure: Current    Smokeless tobacco: Never   Vaping Use    Vaping status: Never Used   Substance Use Topics    Alcohol use: Yes     Comment: 1-2 drinks a week    Drug use: Not Currently     Types: Marijuana       Honoring Choices - Health Care Directive Guide offered to patient at time of visit.    Health Maintenance Due   Topic Date Due    YEARLY PREVENTIVE VISIT  Never done    ADVANCE CARE PLANNING  Never done    DEPRESSION ACTION PLAN  Never done    HIV SCREENING  Never done    HEPATITIS C SCREENING  Never done    INFLUENZA VACCINE (1) 09/01/2024    COVID-19 Vaccine (6 - 2024-25 season) 09/01/2024       Immunization History   Administered Date(s) Administered    COVID-19 12+ (Pfizer) 10/28/2023    COVID-19 Bivalent 12+ (Pfizer) 09/09/2022    COVID-19 MONOVALENT 12+ (Pfizer) 04/09/2021, 04/30/2021, 12/04/2021       No results found for: \"PAP\"    Recent Labs   Lab Test 05/24/24  1508   A1C 5.7*   ALT 77*   CR 1.23*   GFRESTIMATED 82   ALBUMIN 4.7   POTASSIUM 4.4   TSH 0.54           9/16/2024     9:17 AM   PHQ-2 ( 1999 Pfizer)   Q1: Little interest or pleasure in doing things 1   Q2: Feeling down, depressed or hopeless 1   PHQ-2 Score 2   Q1: Little interest or pleasure in doing things Several days   Q2: Feeling down, depressed or hopeless Several days   PHQ-2 Score 2           5/24/2024     2:05 PM   PHQ-9 SCORE   PHQ-9 Total Score MyChart 6 (Mild depression)   PHQ-9 Total Score 6           5/24/2024     2:06 PM   RONALD-7 SCORE   Total Score 1 (minimal anxiety)   Total Score 1            No data to display "                Maricarmen Nicole, EMT    September 16, 2024 2:54 PM

## 2024-09-17 LAB — FERRITIN SERPL-MCNC: 78 NG/ML (ref 31–409)

## 2024-09-18 DIAGNOSIS — Z86.39 HISTORY OF HYPERLIPIDEMIA: Primary | ICD-10-CM

## 2024-09-18 LAB
CHOLEST SERPL-MCNC: 203 MG/DL
HDLC SERPL-MCNC: 55 MG/DL
LDLC SERPL CALC-MCNC: 112 MG/DL
NONHDLC SERPL-MCNC: 148 MG/DL
TRIGL SERPL-MCNC: 181 MG/DL

## 2024-09-18 NOTE — PROGRESS NOTES
Kranthi is a 28 year old who is being evaluated via a billable video visit.    How would you like to obtain your AVS? MyChart  If the video visit is dropped, the invitation should be resent by: Text to cell phone: [phone]  Will anyone else be joining your video visit? No    Subjective   Kranthi is a 28 year old, presenting for the following health issues:  He is here to follow up on his labs, particularly his low/normal ferritin.  He is interested in staying on an iron supplement and would like it prescribed in case his insurance will pay for it.    Kranthi also states he is taking metformin for mental health purposes- this is prescribed by a different provider and he wants to know if this benefits him in anyway other than mental health.      Review of Systems  CONSTITUTIONAL: NEGATIVE for fever, chills, change in weight  ENT/MOUTH: NEGATIVE for ear, mouth and throat problems  RESP: NEGATIVE for significant cough or SOB  CV: NEGATIVE for chest pain, palpitations or peripheral edema      Objective         Vitals:  No vitals were obtained today due to virtual visit.    Physical Exam   GENERAL: alert and no distress  EYES: Eyes grossly normal to inspection.  No discharge or erythema, or obvious scleral/conjunctival abnormalities.  RESP: No audible wheeze, cough, or visible cyanosis.    SKIN: Visible skin clear. No significant rash, abnormal pigmentation or lesions.  NEURO: Cranial nerves grossly intact.  Mentation and speech appropriate for age.  PSYCH: Appropriate affect, tone, and pace of words    Office Visit on 09/16/2024   Component Date Value Ref Range Status    Ferritin 09/16/2024 78  31 - 409 ng/mL Final    Hemoglobin A1C 09/16/2024 5.6  <5.7 % Final    Normal <5.7%   Prediabetes 5.7-6.4%    Diabetes 6.5% or higher     Note: Adopted from ADA consensus guidelines.    Cholesterol 09/16/2024 203 (H)  <200 mg/dL Final    Triglycerides 09/16/2024 181 (H)  <150 mg/dL Final    Direct Measure HDL 09/16/2024 55   >=40 mg/dL Final    LDL Cholesterol Calculated 09/16/2024 112 (H)  <100 mg/dL Final    Non HDL Cholesterol 09/16/2024 148 (H)  <130 mg/dL Final     (R53.83) Other fatigue  Comment: We will prescribe iron for Kranthi for insurance purposes  Plan: ferrous sulfate (FEROSUL) 325 (65 Fe) MG tablet        Recheck ferritin level in 6-12 months    (R79.0) Low ferritin level  Comment: as above  Plan: ferrous sulfate (FEROSUL) 325 (65 Fe) MG tablet        As above    I also told Kranthi that metformin is helping with his glucose metabolism.  He will check this again with the ferritin in 6-12 months.        Video-Visit Details    Type of service:  Video Visit   Originating Location (pt. Location): Home    Distant Location (provider location):  On-site  Platform used for Video Visit: Beatris  Signed Electronically by: Sandy Fitch NP

## 2024-09-18 NOTE — PROGRESS NOTES
"Adan Britton is a 28 year old male who presents today to follow up on his blood pressure, he is a patient of Nguyễn Bhagat.  When he was at the dentist recently his blood pressure was 154/93, he was in pain at that time.  He denies chest pain, he feels any shortness of breath is related to weight gain.  He denies fluid retention.  He does have a FH of hypertension, his mother.    He has some right ear pain, he and Nguyễn had been discussing the need for a ENT and hearing exam.    Kranthi is concerned about his iron levels and some symptoms he had that he and Nguyễn may have attributed to a low ferritin.  Kranthi states he has given blood \"a lot\" in the past year.  He has been on iron supplementation and wants to continue that, he is aware that his ferritin level is in the normal range but thinks he feels better when it is over 100.    Review Of Systems   ROS: 10 point ROS neg other than the symptoms noted above in the HPI.      Past Medical History:   Diagnosis Date    Depressive disorder     Laceration of right index finger 08/11/2023     Past Surgical History:   Procedure Laterality Date    [place] HOME SLEEP TEST/TYPE 3 MOE  6/22/2023          Social History     Socioeconomic History    Marital status: Single     Spouse name: Not on file    Number of children: Not on file    Years of education: Not on file    Highest education level: Not on file   Occupational History    Not on file   Tobacco Use    Smoking status: Never     Passive exposure: Current    Smokeless tobacco: Never   Vaping Use    Vaping status: Never Used   Substance and Sexual Activity    Alcohol use: Yes     Comment: 1-2 drinks a week    Drug use: Not Currently     Types: Marijuana    Sexual activity: Yes     Partners: Female     Birth control/protection: Condom   Other Topics Concern    Not on file   Social History Narrative    Not on file     Social Determinants of Health     Financial Resource Strain: Low Risk  (5/24/2024)    Financial Resource Strain    "  Within the past 12 months, have you or your family members you live with been unable to get utilities (heat, electricity) when it was really needed?: No   Food Insecurity: Low Risk  (5/24/2024)    Food Insecurity     Within the past 12 months, did you worry that your food would run out before you got money to buy more?: No     Within the past 12 months, did the food you bought just not last and you didn t have money to get more?: No   Transportation Needs: Low Risk  (5/24/2024)    Transportation Needs     Within the past 12 months, has lack of transportation kept you from medical appointments, getting your medicines, non-medical meetings or appointments, work, or from getting things that you need?: No   Physical Activity: Unknown (5/24/2024)    Exercise Vital Sign     Days of Exercise per Week: 2 days     Minutes of Exercise per Session: Not on file   Stress: No Stress Concern Present (9/16/2024)    [employer] - Occupational Stress Questionnaire     Feeling of Stress : Only a little   Social Connections: Unknown (5/24/2024)    Social Connection and Isolation Panel [NHANES]     Frequency of Communication with Friends and Family: Not on file     Frequency of Social Gatherings with Friends and Family: Twice a week     Attends Restorationism Services: Not on file     Active Member of Clubs or Organizations: Not on file     Attends Club or Organization Meetings: Not on file     Marital Status: Not on file   Interpersonal Safety: Low Risk  (9/16/2024)    Interpersonal Safety     Do you feel physically and emotionally safe where you currently live?: Yes     Within the past 12 months, have you been hit, slapped, kicked or otherwise physically hurt by someone?: No     Within the past 12 months, have you been humiliated or emotionally abused in other ways by your partner or ex-partner?: No   Housing Stability: Low Risk  (5/24/2024)    Housing Stability     Do you have housing? : Yes     Are you  "worried about losing your housing?: No     Family History   Problem Relation Age of Onset    Hypertension Mother     Anxiety Disorder Father     Narcolepsy Father     Cancer Paternal Grandfather     Cancer Paternal Uncle        BP (!) 149/84 (BP Location: Left arm, Patient Position: Sitting, Cuff Size: Adult Regular)   Pulse 105   Temp 98.2  F (36.8  C) (Oral)   Resp 18   Ht 1.717 m (5' 7.6\")   Wt 94.3 kg (207 lb 12.8 oz)   SpO2 97%   BMI 31.97 kg/m      Exam:  Constitutional: healthy, alert, and no distress  Cardiovascular: negative, PMI normal. No lifts, heaves, or thrills. RRR. No murmurs, clicks gallops or rub.  Trace pretibial edema  Respiratory: negative, Percussion normal. Good diaphragmatic excursion. Lungs clear  Psychiatric: mentation appears normal and affect normal/bright    Assessment/Plan:  1. Prediabetes    - Hemoglobin A1c; Future  - Blood Pressure Monitoring (BLOOD PRESSURE KIT) KIT; 1 kit 2 times daily.  Dispense: 1 kit; Refill: 1  - magnesium oxide (MAG-OX) 400 MG tablet; Take 1 tablet (400 mg) by mouth daily.  Dispense: 60 tablet; Refill: 3    2. Low ferritin    - Ferritin; Future    3. Elevated blood pressure reading without diagnosis of hypertension    4. Disorder of tympanic membrane of both ears    - Adult ENT  Referral; Future    5. History of hyperlipidemia    - Lipid panel reflex to direct LDL Fasting    We will follow up with a virtual visit when all labs back to discuss potential treatment.      Options for treatment and follow-up care were reviewed with the patient. Patient engaged in the decision making process and verbalized understanding of the options discussed and agreed with the final plan.    "

## 2024-09-19 ENCOUNTER — VIRTUAL VISIT (OUTPATIENT)
Dept: FAMILY MEDICINE | Facility: CLINIC | Age: 29
End: 2024-09-19
Payer: COMMERCIAL

## 2024-09-19 DIAGNOSIS — R53.83 OTHER FATIGUE: ICD-10-CM

## 2024-09-19 DIAGNOSIS — R79.0 LOW FERRITIN LEVEL: ICD-10-CM

## 2024-09-19 RX ORDER — FERROUS SULFATE 325(65) MG
325 TABLET ORAL EVERY OTHER DAY
Qty: 60 TABLET | Refills: 1 | Status: SHIPPED | OUTPATIENT
Start: 2024-09-19

## 2024-09-19 ASSESSMENT — PATIENT HEALTH QUESTIONNAIRE - PHQ9: SUM OF ALL RESPONSES TO PHQ QUESTIONS 1-9: 4

## 2024-10-29 ENCOUNTER — PATIENT OUTREACH (OUTPATIENT)
Dept: ONCOLOGY | Facility: CLINIC | Age: 29
End: 2024-10-29
Payer: COMMERCIAL

## 2024-10-29 ENCOUNTER — OFFICE VISIT (OUTPATIENT)
Dept: FAMILY MEDICINE | Facility: CLINIC | Age: 29
End: 2024-10-29
Payer: COMMERCIAL

## 2024-10-29 VITALS
TEMPERATURE: 98.2 F | SYSTOLIC BLOOD PRESSURE: 125 MMHG | HEIGHT: 67 IN | DIASTOLIC BLOOD PRESSURE: 80 MMHG | HEART RATE: 101 BPM | BODY MASS INDEX: 32.51 KG/M2 | OXYGEN SATURATION: 95 % | WEIGHT: 207.1 LBS

## 2024-10-29 DIAGNOSIS — R68.89 CHANGE IN WEIGHT: ICD-10-CM

## 2024-10-29 DIAGNOSIS — R79.89 ELEVATED SERUM CREATININE: Primary | ICD-10-CM

## 2024-10-29 DIAGNOSIS — R53.82 CHRONIC FATIGUE: ICD-10-CM

## 2024-10-29 DIAGNOSIS — R73.03 PREDIABETES: ICD-10-CM

## 2024-10-29 DIAGNOSIS — Z11.59 NEED FOR HEPATITIS C SCREENING TEST: ICD-10-CM

## 2024-10-29 DIAGNOSIS — R74.8 ELEVATED LIVER ENZYMES: ICD-10-CM

## 2024-10-29 DIAGNOSIS — Z80.9 FAMILY HISTORY OF CANCER: ICD-10-CM

## 2024-10-29 DIAGNOSIS — R53.83 FATIGUE, UNSPECIFIED TYPE: ICD-10-CM

## 2024-10-29 DIAGNOSIS — Z86.2 HISTORY OF ANEMIA: ICD-10-CM

## 2024-10-29 DIAGNOSIS — R63.5 WEIGHT GAIN: ICD-10-CM

## 2024-10-29 LAB
ALBUMIN UR-MCNC: 30 MG/DL
APPEARANCE UR: ABNORMAL
BILIRUB UR QL STRIP: NEGATIVE
COLOR UR AUTO: ABNORMAL
CREAT UR-MCNC: 241 MG/DL
ERYTHROCYTE [DISTWIDTH] IN BLOOD BY AUTOMATED COUNT: 12.7 % (ref 10–15)
GLUCOSE UR STRIP-MCNC: NEGATIVE MG/DL
HCT VFR BLD AUTO: 48.6 % (ref 40–53)
HGB BLD-MCNC: 16.3 G/DL (ref 13.3–17.7)
HGB UR QL STRIP: NEGATIVE
KETONES UR STRIP-MCNC: NEGATIVE MG/DL
LEUKOCYTE ESTERASE UR QL STRIP: NEGATIVE
MCH RBC QN AUTO: 29.7 PG (ref 26.5–33)
MCHC RBC AUTO-ENTMCNC: 33.5 G/DL (ref 31.5–36.5)
MCV RBC AUTO: 89 FL (ref 78–100)
MICROALBUMIN UR-MCNC: 20.1 MG/L
MICROALBUMIN/CREAT UR: 8.34 MG/G CR (ref 0–17)
NITRATE UR QL: NEGATIVE
PH UR STRIP: 7 [PH] (ref 5–7)
PLATELET # BLD AUTO: 273 10E3/UL (ref 150–450)
RBC # BLD AUTO: 5.49 10E6/UL (ref 4.4–5.9)
SP GR UR STRIP: 1.02 (ref 1–1.03)
UROBILINOGEN UR STRIP-ACNC: 0.2 E.U./DL
WBC # BLD AUTO: 5.1 10E3/UL (ref 4–11)

## 2024-10-29 PROCEDURE — 83550 IRON BINDING TEST: CPT | Mod: ORL | Performed by: NURSE PRACTITIONER

## 2024-10-29 PROCEDURE — 84443 ASSAY THYROID STIM HORMONE: CPT | Mod: ORL | Performed by: NURSE PRACTITIONER

## 2024-10-29 PROCEDURE — 85027 COMPLETE CBC AUTOMATED: CPT | Mod: ORL | Performed by: NURSE PRACTITIONER

## 2024-10-29 PROCEDURE — 82043 UR ALBUMIN QUANTITATIVE: CPT | Mod: ORL | Performed by: NURSE PRACTITIONER

## 2024-10-29 PROCEDURE — 82728 ASSAY OF FERRITIN: CPT | Mod: ORL | Performed by: NURSE PRACTITIONER

## 2024-10-29 PROCEDURE — 86803 HEPATITIS C AB TEST: CPT | Mod: ORL | Performed by: NURSE PRACTITIONER

## 2024-10-29 PROCEDURE — 84155 ASSAY OF PROTEIN SERUM: CPT | Mod: ORL | Performed by: NURSE PRACTITIONER

## 2024-10-29 RX ORDER — DIPHENHYD/PHENYLEPH/ACETAMINOP 12.5-5-325
1 TABLET ORAL 2 TIMES DAILY
Qty: 1 KIT | Refills: 1 | Status: SHIPPED | OUTPATIENT
Start: 2024-10-29

## 2024-10-29 RX ORDER — DIPHENHYD/PHENYLEPH/ACETAMINOP 12.5-5-325
1 TABLET ORAL 2 TIMES DAILY
Qty: 1 KIT | Refills: 1 | Status: SHIPPED | OUTPATIENT
Start: 2024-10-29 | End: 2024-10-29

## 2024-10-29 NOTE — PROGRESS NOTES
Writer received Cancer Risk Management Program referral, referred for:    patient has strong family history of pancreatic cancer, liver cancer; would like to discuss with       Reviewed for appropriate plan, and sent to New Patient Scheduling for completion.

## 2024-10-29 NOTE — PROGRESS NOTES
HPI       Adan Britton is a 29 year old  who presents for   Chief Complaint   Patient presents with    STD     HSV test, thinks may have been exposed years ago    Results     Testing for cancer, MRI     29 year-old male with history of elevated creatinine and ALT, weight changes, HLD, preDM returns to clinic for follow up.  Has several concerns today:    Has strong FMH cancer: Pancreatic, liver, lung. Would like full body MRI Or US of all body parts to look for cancer. FMH lung, pancreatic, liver cancer. No current symptoms of abdominal pain, SOB.    Sustained 2 year loosening of bowels, stable; not related to medication or change in lifestyle. No blood or mucus. Had about 20 pound weight loss mid last year and has regained that and more.     Diet variable, carbohydrates heavy in am. Cooks dinner at night which is better  Alcohol once a month: usually 1 drink. Denies other substance use    Does not sleep well. Had sleep study: subclinical sleep apnea. Wakes up feeling ok and then feels more fatigue as day progresses. Trazodone every night. Unaware if snores. Sleep study: recommended back pillow and weight loss. Tried both. Neither successful    Mood: Down. Sees counselor and is transitioning to lamotrigine, tapering duloxetine and aripiprazole. Still on Welbutrin. Denies thoughts of harm     Problem, Medication and Allergy Lists were reviewed and updated if needed..    Patient is an established patient of this clinic..         Review of Systems:   Review of Systems  GEN: negative fever, some fatigue. No chills. Weight loss last year but gain this year  NEURO: denies headache, balance issues, weakness, numbness or tingling  RESP: negative for SOB, cough, exercise intolerance  CV: negative for CP, irregular heart beat, peripheral edema  GI: negative for pain, STEFAN. Stool as per HPI  : negative for dysuria, urgency, frequency.   MSK: negative for joint pain or swelling  SKIN: No rash  MOOD: as per HPI        "Physical Exam:     Vitals:    10/29/24 1456   BP: 125/80   Pulse: 101   Temp: 98.2  F (36.8  C)   TempSrc: Oral   SpO2: 95%   Weight: 93.9 kg (207 lb 1.6 oz)   Height: 1.712 m (5' 7.4\")     Body mass index is 32.05 kg/m .  Vitals were reviewed and were normal     Physical Exam  GEN: Alert, oriented and in NAD  Eyes clear, EOM intact  Neck supple. Thyroid smooth.   LUNGS: CTA with air entry throughout  CV: HRRR, S1, S2, no MRG. Negative peripheral edema. Pedal pulses +2  ABD: slight tenderness over SP, rest nontender. No masses or HSM  NEURO: intact  SKIN: no rash        Results:    Results from this visit  Results for orders placed or performed in visit on 10/29/24   UA without Microscopic     Status: Abnormal   Result Value Ref Range    Color Urine Dark Yellow (A) Colorless, Straw, Light Yellow, Yellow    Appearance Urine Cloudy (A) Clear    Glucose Urine Negative Negative mg/dL    Bilirubin Urine Negative Negative    Ketones Urine Negative Negative mg/dL    Specific Gravity Urine 1.025 1.003 - 1.035    Blood Urine Negative Negative    pH Urine 7.0 5.0 - 7.0    Protein Albumin Urine 30 (A) Negative mg/dL    Urobilinogen Urine 0.2 0.2, 1.0 E.U./dL    Nitrite Urine Negative Negative    Leukocyte Esterase Urine Negative Negative     Other results ordered and pending  Assessment and Plan        1. Elevated serum creatinine (Primary)  Uncertain etiology. Several medications he is on can have renal effects but tapering some. Is , so works around metals. He has concern for underlying disease, but few symptoms. Will check metabolic panel. Reports he had a lead test earlier this year and does not think he needs it repeated. UA WNL. WIll check protein/creatinine ratio also. Could consider EDIL if declining renal function.   - Comprehensive metabolic panel; Future  - Comprehensive metabolic panel  - UA without Microscopic; Future  - Albumin Random Urine Quantitative with Creat Ratio; Future  - UA without Microscopic  - " Albumin Random Urine Quantitative with Creat Ratio    2. Elevated liver enzymes  Uncertain etiology. CMP and hep c  - Comprehensive metabolic panel; Future  - Comprehensive metabolic panel    3. History of anemia  Discussed iron therapy. He is using every other day now. Eats some sources of iron. Reports iron deficiency since childhood. Has been taking iron daily most of his life. Discussed how iron/hepcidin work and recommend every other day. Will recheck CBC, ferritin and iron levels.   - CBC with platelets; Future  - Ferritin; Future  - Iron and iron binding capacity; Future  - CBC with platelets  - Ferritin  - Iron and iron binding capacity    4. Family history of cancer  Not currently symptomatic. Discussed 2 incidences of lung cancer likely due to smoking. He would like to see . Explained that cannot do full body MRI or US.   - Adult Oncology/Hematology  Referral; Future    5. Need for hepatitis C screening test  Would like repeat of Hep C screening today  - Hepatitis C Screen Reflex to HCV RNA Quant and Genotype; Future  - Hepatitis C Screen Reflex to HCV RNA Quant and Genotype    6. Change in weight  Weight fluctuating. Diet is not always healthy. Discussed nutrition referral but he declines. Previous TSH was normal but would like recheck  - TSH with free T4 reflex; Future  - TSH with free T4 reflex    7. Weight gain  BMI 32. Referred to weight management.  Might benefit from GLP-1. Not interested in bariatric surgery at this time.   - Adult Comprehensive Weight Management  Referral; Future    8. Prediabetes  Reordered BP monitor previously ordered as he was unable to get. Printed and given information for DME. Continue on metformin.   - Blood Pressure Monitoring (BLOOD PRESSURE KIT) KIT; 1 kit 2 times daily.  Dispense: 1 kit; Refill: 1    9. Fatigue: may  be related to mood, poor sleep. Relaxation, healthy diet and exercise. Follow with primary if not improving.      There are no  discontinued medications.    Options for treatment and follow-up care were reviewed with the patient. Adan Britton  engaged in the decision making process and verbalized understanding of the options discussed and agreed with the final plan.    MEERA Barbosa CNP

## 2024-10-29 NOTE — NURSING NOTE
"ROOM:1  [facility]    Preferred Name: Kranthi     Social Determinants of Health   SDoH screening reviewed today: No     Services Provided? No    29 year old  Chief Complaint   Patient presents with    STD     HSV test, thinks may have been exposed years ago    Results     Testing for cancer, MRI       Blood pressure 125/80, pulse 101, temperature 98.2  F (36.8  C), temperature source Oral, height 1.712 m (5' 7.4\"), weight 93.9 kg (207 lb 1.6 oz), SpO2 95%. Body mass index is 32.05 kg/m .  BP completed using cuff size:        There is no problem list on file for this patient.      Wt Readings from Last 2 Encounters:   10/29/24 93.9 kg (207 lb 1.6 oz)   09/16/24 94.3 kg (207 lb 12.8 oz)     BP Readings from Last 3 Encounters:   10/29/24 125/80   09/16/24 (!) 149/84   05/24/24 127/77       Allergies   Allergen Reactions    Ibuprofen Rash       Current Outpatient Medications   Medication Sig Dispense Refill    ARIPiprazole (ABILIFY) 5 MG tablet Take 2 mg by mouth daily.      buPROPion (WELLBUTRIN XL) 150 MG 24 hr tablet TAKE 1 TABLET BY MOUTH DAILY (WITH 300 MG FOR A TOTAL  MG)      buPROPion (WELLBUTRIN XL) 300 MG 24 hr tablet       cholecalciferol (VITAMIN D3) 125 mcg (5000 units) capsule       DULoxetine (CYMBALTA) 60 MG capsule Take 60 mg by mouth 2 times daily.      ferrous sulfate (FEROSUL) 325 (65 Fe) MG tablet Take 1 tablet (325 mg) by mouth every other day. 60 tablet 1    hydrOXYzine (ATARAX) 25 MG tablet AS DIRECTED 1TAB IN AM OR AFTERNOON AS NEEDED FOR ANXIETY &2-4TABS AT BEDTIME AS NEEDED FOR INSOMNIA      lamoTRIgine (LAMICTAL) 100 MG tablet Take 200 mg by mouth daily.      magnesium oxide (MAG-OX) 400 MG tablet Take 1 tablet (400 mg) by mouth daily. 60 tablet 3    metFORMIN (GLUCOPHAGE) 500 MG tablet Take 500 mg by mouth daily (with breakfast).      multivitamin w/minerals (MULTI-VITAMIN) tablet Take 1 tablet by mouth daily      traZODone (DESYREL) 100 MG tablet Take 200 mg by " "mouth at bedtime.      Blood Pressure Monitoring (BLOOD PRESSURE KIT) KIT 1 kit 2 times daily. (Patient not taking: Reported on 10/29/2024) 1 kit 1    magnesium gluconate (MAGONATE) 250 MG tablet Take 400 mg by mouth daily. (Patient not taking: Reported on 10/29/2024)       No current facility-administered medications for this visit.       Social History     Tobacco Use    Smoking status: Never     Passive exposure: Current    Smokeless tobacco: Never   Vaping Use    Vaping status: Never Used   Substance Use Topics    Alcohol use: Yes     Comment: 1-2 drinks a week    Drug use: Not Currently     Types: Marijuana       Honoring Choices - Health Care Directive Guide offered to patient at time of visit.    Health Maintenance Due   Topic Date Due    YEARLY PREVENTIVE VISIT  Never done    ADVANCE CARE PLANNING  Never done    DEPRESSION ACTION PLAN  Never done    HIV SCREENING  Never done    HEPATITIS C SCREENING  Never done       Immunization History   Administered Date(s) Administered    COVID-19 12+ (MODERNA) 10/15/2024    COVID-19 12+ (Pfizer) 10/28/2023    COVID-19 Bivalent 12+ (Pfizer) 09/09/2022    COVID-19 MONOVALENT 12+ (Pfizer) 04/09/2021, 04/30/2021, 12/04/2021       No results found for: \"PAP\"    Recent Labs   Lab Test 09/16/24  1532 05/24/24  1508   A1C 5.6 5.7*   *  --    HDL 55  --    TRIG 181*  --    ALT  --  77*   CR  --  1.23*   GFRESTIMATED  --  82   ALBUMIN  --  4.7   POTASSIUM  --  4.4   TSH  --  0.54           10/29/2024     2:50 PM 9/19/2024     7:48 AM   PHQ-2 ( 1999 Pfizer)   Q1: Little interest or pleasure in doing things 1  1    Q2: Feeling down, depressed or hopeless 1  1    PHQ-2 Score 2  2   Q1: Little interest or pleasure in doing things Several days Several days   Q2: Feeling down, depressed or hopeless Several days Several days   PHQ-2 Score 2 2       Patient-reported           5/24/2024     2:05 PM 9/19/2024     7:57 AM   PHQ-9 SCORE   PHQ-9 Total Score MyChart 6 (Mild depression)  "   PHQ-9 Total Score 6 4           5/24/2024     2:06 PM   RONALD-7 SCORE   Total Score 1 (minimal anxiety)   Total Score 1            No data to display                aMrgarita Alvarez [facility]    October 29, 2024 2:58 PM

## 2024-10-30 LAB
ALBUMIN SERPL BCG-MCNC: 5.1 G/DL (ref 3.5–5.2)
ALP SERPL-CCNC: 73 U/L (ref 40–150)
ALT SERPL W P-5'-P-CCNC: 102 U/L (ref 0–70)
ANION GAP SERPL CALCULATED.3IONS-SCNC: 15 MMOL/L (ref 7–15)
AST SERPL W P-5'-P-CCNC: 45 U/L (ref 0–45)
BILIRUB SERPL-MCNC: 0.4 MG/DL
BUN SERPL-MCNC: 14.4 MG/DL (ref 6–20)
CALCIUM SERPL-MCNC: 9.6 MG/DL (ref 8.8–10.4)
CHLORIDE SERPL-SCNC: 105 MMOL/L (ref 98–107)
CREAT SERPL-MCNC: 1.18 MG/DL (ref 0.67–1.17)
EGFRCR SERPLBLD CKD-EPI 2021: 85 ML/MIN/1.73M2
FERRITIN SERPL-MCNC: 114 NG/ML (ref 31–409)
GLUCOSE SERPL-MCNC: 86 MG/DL (ref 70–99)
HCO3 SERPL-SCNC: 23 MMOL/L (ref 22–29)
HCV AB SERPL QL IA: NONREACTIVE
IRON BINDING CAPACITY (ROCHE): 310 UG/DL (ref 240–430)
IRON SATN MFR SERPL: 31 % (ref 15–46)
IRON SERPL-MCNC: 96 UG/DL (ref 61–157)
POTASSIUM SERPL-SCNC: 4.1 MMOL/L (ref 3.4–5.3)
PROT SERPL-MCNC: 7.8 G/DL (ref 6.4–8.3)
SODIUM SERPL-SCNC: 143 MMOL/L (ref 135–145)
TSH SERPL DL<=0.005 MIU/L-ACNC: 1.1 UIU/ML (ref 0.3–4.2)

## 2024-11-13 ASSESSMENT — PATIENT HEALTH QUESTIONNAIRE - PHQ9
10. IF YOU CHECKED OFF ANY PROBLEMS, HOW DIFFICULT HAVE THESE PROBLEMS MADE IT FOR YOU TO DO YOUR WORK, TAKE CARE OF THINGS AT HOME, OR GET ALONG WITH OTHER PEOPLE: SOMEWHAT DIFFICULT
SUM OF ALL RESPONSES TO PHQ QUESTIONS 1-9: 4
SUM OF ALL RESPONSES TO PHQ QUESTIONS 1-9: 4

## 2024-11-15 ENCOUNTER — OFFICE VISIT (OUTPATIENT)
Dept: FAMILY MEDICINE | Facility: CLINIC | Age: 29
End: 2024-11-15
Payer: COMMERCIAL

## 2024-11-15 VITALS
SYSTOLIC BLOOD PRESSURE: 111 MMHG | HEART RATE: 85 BPM | OXYGEN SATURATION: 97 % | WEIGHT: 202 LBS | HEIGHT: 68 IN | DIASTOLIC BLOOD PRESSURE: 74 MMHG | RESPIRATION RATE: 20 BRPM | BODY MASS INDEX: 30.62 KG/M2 | TEMPERATURE: 97.7 F

## 2024-11-15 DIAGNOSIS — R79.89 ELEVATED SERUM CREATININE: ICD-10-CM

## 2024-11-15 DIAGNOSIS — R74.01 ELEVATED ALT MEASUREMENT: Primary | ICD-10-CM

## 2024-11-15 DIAGNOSIS — R73.03 PREDIABETES: ICD-10-CM

## 2024-11-15 DIAGNOSIS — Z86.2 HISTORY OF ANEMIA: ICD-10-CM

## 2024-11-15 NOTE — PROGRESS NOTES
"  Assessment & Plan     Elevated ALT measurement  Most likely metabolic associated liver dysfunction.  Has negative hepatitis C antibody.  History of hepatitis B vaccine.  No regular alcohol use.  Further evaluation recommended with ultrasound.  - US Abdomen Complete; Future    Elevated serum creatinine  Very slight, gradual increasing serum creatinine over the past few years.  Current GFR is 85.  Urinalysis is essentially negative.  Possibly related to medications, no clear etiology.  Will check renal ultrasound.  - US Abdomen Complete; Future    History of anemia  Iron deficiency due to frequent phlebotomy for blood donation.  Patient will reduce frequency of phlebotomy now that ferritin has not improved with iron supplementation.  - continue ferrous sulfate as he plans to resume blood donation, but recommend taking twice weekly     Prediabetes  Mild prediabetes with a prior A1c of 5.7% in May, now 5.6% in September.  -Discussed patient's weight with a goal BMI of 25, weight 265 pounds.  -Again reviewed diet with patient who is familiar with recommendations  -Continue metformin      BMI  Estimated body mass index is 30.44 kg/m  as calculated from the following:    Height as of this encounter: 1.735 m (5' 8.31\").    Weight as of this encounter: 91.6 kg (202 lb).   Weight management plan: Discussed healthy diet and exercise guidelines      Return for Follow up pending ultrasound.    Merly Hamm MD    ===========================    Subjective   Kranthi is a 29 year old, presenting for the following health issues:  Establish Care (Was going to unit(s) of  ), Results (From last visit ), and Reffaral  (New referral need to be place in ( Both ears ))        11/15/2024     8:49 AM   Additional Questions   Roomed by Olivia salcido         11/15/2024    Information    services provided? No      29-year-old new patient to this clinic, referred from his psychiatry provider, Geeta Stoll.  " Generally feeling well but would like to follow-up on the following health issues.    Patient has a history of a mood disorder for which he takes Wellbutrin and lamotrigine.  He has been tapering off of duloxetine and Abilify.  Patient has had some weight gain with the Abilify and this likely has contributed to metabolic effects.  Previously had elevated triglycerides on his lipid panel, but on recent check was improved.  He also has a history of prediabetes with A1c of 5.7% this year, improved to 5.6% on metformin which he started in May.      He does not exercise regularly.  He has been working on improving his diet.  Previously was eating a lot of fast food until June and now is primarily eating frozen meals.  He found that his appetite decreased after stopping Abilify.  He had been referred to the weight management clinic but has not yet scheduled.  He plans to see if his weight decreases with his medication changes.    Recently noted was also increase of his ALT level in May, ALT at that time was 77 and in October was 102.  Hepatitis C antibody negative, immunized for hepatitis B.  Minimal alcohol intake, 1 drink per month.    Patient has also had a increasing serum creatinine level.  In 2020 serum creatinine was 0.96, being 2021 it was 1.04 and this year was 1.23 and on recheck in October, it was 1.18 urinalysis was positive for protein but urine albumin/creatinine ratio was normal at 8.3.  Patient denies any urinary symptoms or history of kidney disease.    Patient is taking iron for history of iron deficiency anemia.  This is likely due to frequent phlebotomy for blood donation.  He started every other day ferrous sulfate with increase of his ferritin to 114 in October.  He has stopped donating blood but plans to resume doing so but less frequently, plans to donate blood about 3 times per year moving forward.    Patient has a concern about having cancer, particularly pancreatic cancer.  He has a family  "history of lung, pancreatic, and liver cancer.  His prior PCP referred him to genetics and that appointment is pending.          Objective    /74   Pulse 85   Temp 97.7  F (36.5  C) (Oral)   Resp 20   Ht 1.735 m (5' 8.31\")   Wt 91.6 kg (202 lb)   SpO2 97%   BMI 30.44 kg/m    Body mass index is 30.44 kg/m .    GENERAL: alert and no distress  RESP: lungs clear to auscultation - no rales, rhonchi or wheezes  CV: regular rate and rhythm, normal S1 S2, no S3 or S4, no murmur, click or rub, no peripheral edema   PSYCH: mentation appears normal, affect normal/bright    Office Visit on 10/29/2024   Component Date Value Ref Range Status    Sodium 10/29/2024 143  135 - 145 mmol/L Final    Potassium 10/29/2024 4.1  3.4 - 5.3 mmol/L Final    Carbon Dioxide (CO2) 10/29/2024 23  22 - 29 mmol/L Final    Anion Gap 10/29/2024 15  7 - 15 mmol/L Final    Urea Nitrogen 10/29/2024 14.4  6.0 - 20.0 mg/dL Final    Creatinine 10/29/2024 1.18 (H)  0.67 - 1.17 mg/dL Final    GFR Estimate 10/29/2024 85  >60 mL/min/1.73m2 Final    eGFR calculated using 2021 CKD-EPI equation.    Calcium 10/29/2024 9.6  8.8 - 10.4 mg/dL Final    Reference intervals for this test were updated on 7/16/2024 to reflect our healthy population more accurately. There may be differences in the flagging of prior results with similar values performed with this method. Those prior results can be interpreted in the context of the updated reference intervals.    Chloride 10/29/2024 105  98 - 107 mmol/L Final    Glucose 10/29/2024 86  70 - 99 mg/dL Final    Alkaline Phosphatase 10/29/2024 73  40 - 150 U/L Final    AST 10/29/2024 45  0 - 45 U/L Final    ALT 10/29/2024 102 (H)  0 - 70 U/L Final    Protein Total 10/29/2024 7.8  6.4 - 8.3 g/dL Final    Albumin 10/29/2024 5.1  3.5 - 5.2 g/dL Final    Bilirubin Total 10/29/2024 0.4  <=1.2 mg/dL Final    WBC Count 10/29/2024 5.1  4.0 - 11.0 10e3/uL Final    RBC Count 10/29/2024 5.49  4.40 - 5.90 10e6/uL Final    " Hemoglobin 10/29/2024 16.3  13.3 - 17.7 g/dL Final    Hematocrit 10/29/2024 48.6  40.0 - 53.0 % Final    MCV 10/29/2024 89  78 - 100 fL Final    MCH 10/29/2024 29.7  26.5 - 33.0 pg Final    MCHC 10/29/2024 33.5  31.5 - 36.5 g/dL Final    RDW 10/29/2024 12.7  10.0 - 15.0 % Final    Platelet Count 10/29/2024 273  150 - 450 10e3/uL Final    Ferritin 10/29/2024 114  31 - 409 ng/mL Final    Iron 10/29/2024 96  61 - 157 ug/dL Final    Iron Binding Capacity 10/29/2024 310  240 - 430 ug/dL Final    Iron Sat Index 10/29/2024 31  15 - 46 % Final    Hepatitis C Antibody 10/29/2024 Nonreactive  Nonreactive Final    A nonreactive screening test result does not exclude the possibility of exposure to or infection with HCV. Nonreactive screening test results in individuals with prior exposure to HCV may be due to antibody levels below the limit of detection of this assay or lack of reactivity to the HCV antigens used in this assay. Patients with recent HCV infections (<3 months from time of exposure) may have false-negative HCV antibody results due to the time needed for seroconversion (average of 8 to 9 weeks).    TSH 10/29/2024 1.10  0.30 - 4.20 uIU/mL Final    Color Urine 10/29/2024 Dark Yellow (A)  Colorless, Straw, Light Yellow, Yellow Final    Appearance Urine 10/29/2024 Cloudy (A)  Clear Final    Glucose Urine 10/29/2024 Negative  Negative mg/dL Final    Bilirubin Urine 10/29/2024 Negative  Negative Final    Ketones Urine 10/29/2024 Negative  Negative mg/dL Final    Specific Gravity Urine 10/29/2024 1.025  1.003 - 1.035 Final    Blood Urine 10/29/2024 Negative  Negative Final    pH Urine 10/29/2024 7.0  5.0 - 7.0 Final    Protein Albumin Urine 10/29/2024 30 (A)  Negative mg/dL Final    Urobilinogen Urine 10/29/2024 0.2  0.2, 1.0 E.U./dL Final    Nitrite Urine 10/29/2024 Negative  Negative Final    Leukocyte Esterase Urine 10/29/2024 Negative  Negative Final    Creatinine Urine mg/dL 10/29/2024 241.0  mg/dL Final    The  reference ranges have not been established in urine creatinine. The results should be integrated into the clinical context for interpretation.    Albumin Urine mg/L 10/29/2024 20.1  mg/L Final    The reference ranges have not been established in urine albumin. The results should be integrated into the clinical context for interpretation.    Albumin Urine mg/g Cr 10/29/2024 8.34  0.00 - 17.00 mg/g Cr Final    Microalbuminuria is defined as an albumin:creatinine ratio of 17 to 299 for males and 25 to 299 for females. A ratio of albumin:creatinine of 300 or higher is indicative of overt proteinuria.  Due to biologic variability, positive results should be confirmed by a second, first-morning random or 24-hour timed urine specimen. If there is discrepancy, a third specimen is recommended. When 2 out of 3 results are in the microalbuminuria range, this is evidence for incipient nephropathy and warrants increased efforts at glucose control, blood pressure control, and institution of therapy with an angiotensin-converting-enzyme (ACE) inhibitor (if the patient can tolerate it).             Signed Electronically by: Merly Hamm MD

## 2024-11-18 ENCOUNTER — TELEPHONE (OUTPATIENT)
Dept: OTOLARYNGOLOGY | Facility: CLINIC | Age: 29
End: 2024-11-18
Payer: COMMERCIAL

## 2024-11-18 NOTE — TELEPHONE ENCOUNTER
Left Voicemail (1st Attempt) for the patient to call back and schedule the following:    Appointment Type: New Ear  Provider: Chiquita Thompson PA-C and Cynthia Flores NP   Appt date: next open  Specialty phone number: [phone]  Additional appointment(s) needed: audio  Additional Notes: Per Dr Spaulding- schedule him with one of the APPs with an audio. He does not have abnormal TMs. He has wax which is obstructing the view of the TMs and ear pain.

## 2024-11-19 ENCOUNTER — ANCILLARY PROCEDURE (OUTPATIENT)
Dept: ULTRASOUND IMAGING | Facility: CLINIC | Age: 29
End: 2024-11-19
Attending: FAMILY MEDICINE
Payer: COMMERCIAL

## 2024-11-19 DIAGNOSIS — R79.89 ELEVATED SERUM CREATININE: ICD-10-CM

## 2024-11-19 DIAGNOSIS — R74.01 ELEVATED ALT MEASUREMENT: ICD-10-CM

## 2024-12-10 ASSESSMENT — SLEEP AND FATIGUE QUESTIONNAIRES
HOW LIKELY ARE YOU TO NOD OFF OR FALL ASLEEP WHILE LYING DOWN TO REST IN THE AFTERNOON WHEN CIRCUMSTANCES PERMIT: HIGH CHANCE OF DOZING
HOW LIKELY ARE YOU TO NOD OFF OR FALL ASLEEP WHILE SITTING AND TALKING TO SOMEONE: WOULD NEVER DOZE
HOW LIKELY ARE YOU TO NOD OFF OR FALL ASLEEP WHILE SITTING QUIETLY AFTER LUNCH WITHOUT ALCOHOL: SLIGHT CHANCE OF DOZING
HOW LIKELY ARE YOU TO NOD OFF OR FALL ASLEEP WHILE WATCHING TV: WOULD NEVER DOZE
HOW LIKELY ARE YOU TO NOD OFF OR FALL ASLEEP WHEN YOU ARE A PASSENGER IN A CAR FOR AN HOUR WITHOUT A BREAK: WOULD NEVER DOZE
HOW LIKELY ARE YOU TO NOD OFF OR FALL ASLEEP IN A CAR, WHILE STOPPED FOR A FEW MINUTES IN TRAFFIC: SLIGHT CHANCE OF DOZING
HOW LIKELY ARE YOU TO NOD OFF OR FALL ASLEEP WHILE SITTING AND READING: WOULD NEVER DOZE
HOW LIKELY ARE YOU TO NOD OFF OR FALL ASLEEP WHILE SITTING INACTIVE IN A PUBLIC PLACE: SLIGHT CHANCE OF DOZING

## 2024-12-10 NOTE — PROGRESS NOTES
"  43 minutes spent by me on the date of the encounter doing chart review, history and exam, documentation and further activities per the note    New Medical Weight Management Consult    PATIENT:  Adan Britton  MRN:         [id number]  :         1995  RACHEL:         2024    Dear PCP,    I had the pleasure of seeing your patient, Adan Britton. Full intake/assessment was done to determine barriers to weight loss success and develop a treatment plan. Adan Britton is a 29 year old male interested in treatment of medical problems associated with excess weight. He has a height of 5' 7\", a weight of 205 lbs 0 oz, and the calculated Body mass index is 32.11 kg/m .      Appointment was cut 25 minutes short than ideal as Kranthi had to go back to work      Assessment & Plan   Problem List Items Addressed This Visit       Prediabetes    Relevant Medications    famotidine (PEPCID) 20 MG tablet    Other Relevant Orders    Parathyroid Hormone Intact    Vitamin D Deficiency    Comprehensive metabolic panel    Hemoglobin A1c    Adult Mental [facility]  Referral     Other Visit Diagnoses       Class 1 obesity with serious comorbidity and body mass index (BMI) of 32.0 to 32.9 in adult, unspecified obesity type    -  Primary    Relevant Medications    famotidine (PEPCID) 20 MG tablet    Other Relevant Orders    Parathyroid Hormone Intact    Vitamin D Deficiency    Comprehensive metabolic panel    Hemoglobin A1c    Adult Mental Health  Referral    Weight gain        Depression, unspecified depression type        Relevant Orders    Adult Mental Health  Referral    Gastroesophageal reflux disease without esophagitis        Relevant Medications    famotidine (PEPCID) 20 MG tablet             Plan  Start famotidine 20mg twice daily for heart burn symptoms   No weight loss meds started today due to Kranthi's preference to focus on conservative measures at this time. Discussed risks, benefits, and details of " wegovy, could start this in between appointments via [employer] if Kranthi is interested   Goals we discussed today:   Track food prior to meeting with dietician, popular apps are loseit, myfitness pal   Labs ordered today: new [facility] labs   Follow up with Terri in 3 months   Referral placed to mental health provider as depression and issues with motivation seem to a persistent obstacle for Kranthi in habit changes, mental health   Dietician appointment scheduled 1/2/25             Potential anti-obesity medications for this patient the future if interested in starting meds   WEGOVY  No history of pancreatitis   No personal or family history of medullary thyroid carcinoma  No personal or family history of Multiple Endocrine Neoplasia Type 2  Caution would be needed with this medication due to GERD at baseline, will start famotidine daily for this. Also caution needed due to depression, would monitor for change in symptoms. Further caution needed due to remote history of purging at most once a month after episode of overeating, Kranthi has not done this in a year and denies urges towards purging at this time. Would monitor closely.     . Side effects and risks associated with this medication, as well as medication administration instructions, were discussed. Patient expressed understanding and a willingness to proceed with starting this medication.     NALTREXONE  Did not discuss, could consider in the future   Would see synergistic effect with bupropion that patient is already taking  No chronic pain   No current opioid use   Caution is needed with this medication due to elevated LFTs recently, would want to confirm stable levels prior to starting   .      The following medications could be considered with caution for this patient   PHENTERMINE  Did not discuss, could consider in the future   No history of hypertension  No history of CVA   No history of cardiovascular disease   No history of cardiac arrhythmias   No  "history of glaucoma  No history of drug abuse  No history of hyperthyroidism  Caution would be needed with this medication due to depression, would want to confirm with psychiatrist prior to starting   .  TOPIRAMATE  Did not discuss, could consider in the future   No history of kidney stones  No history of glaucoma   No issues with memory  No chronic kidney disease   Caution would be needed with this medication due to depression, would want to confirm with psych provider prior to trying   .         Adan Britton is a 29 year old male who presents to clinic today for the following health issues.     He has the following co-morbidities:        12/10/2024     2:16 PM   --   I have the following health issues associated with obesity Pre-Diabetes    High Cholesterol   I have the following symptoms associated with obesity Depression    Fatigue            No data to display                    12/10/2024     2:16 PM   Referring Provider   Please name the provider who referred you to Medical Weight Management  If you do not know, please answer \"I Don't Know\" Merly Dunbarwski     Overweight onset as an adolescent, age [age] was 70-80kg (155 to 176lbs).  Recalls maintaining weight between 170- 180lbs for the next 9 years. Age 28 saw more rapid weight gain despite not changing many eating habits, weight gain from 180lbs to 212 which was highest weight in life, saw unexplained drop in weight summer 2024 to 180lbs, but then weight regain to 207lbs which is where he is today.     Of note: ages [age] would induce vomiting about once a month with the goal of maintaining weight after overeating. Stopped doing this for 8 years, estimates he has done this maybe 4 times over the last couple of years. Last purged about 1 year ago. Worked with therapist on this. Does not currently feel an urge to induce purging.       Comorbidities associated with weight gain include history  prediabetes (most recent A1c 5.6, down from 5.7), dyslipidemia. "   Additional health issues include chronic fatigue, depression (works with psych med provider).     Motivators for weight loss include improve self esteem, improve health, improve life expectancy.     He is interested in starting a medication as a tool for working towards sustainable weight loss.      Regarding eating patterns and diet, he  typically eats 2-4 meals a day.  Is able to get full. Can stay full until next meal. Does not  struggle with portion control. Does at times experience food noise. Might experience emotional eating once every 2-3 months (stress). At times experiences a loss of control around eating, for example if eating ice cream will tend to eat a full pint.     Eats out/ gets take out 7 a week. Drinks starbucks coffee most mornings- mocha or flavored latte with oatmilk. Diet soda- 1-2 bottles diet coke or diet pepsi or diet doctor melly. Lost his water bottle a few months ago, prior to this was drinking water more regularly- would be open to buying a new water bottle. ETOH once a week or less, 1 drink in a sitting.     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 No structured exercise currently, struggles to exercise due to issues with motivation with mental health. Previously was walking more regularly. Did bouldering in the past, enjoyed this, struggling to find motivation to do this.       Current AOMs   Metformin- 500mg- to help with counteracting metabolic effects of other anti depressant meds. Denies side effects.    Wellbutrin 450mg- to help with depression, weight loss. Unsure if it's helping with weight loss.             12/10/2024     2:16 PM   Weight History   How concerned are you about your weight? Somewhat Concerned   I became overweight As an Adult   The following factors have contributed to my weight gain Mental Health Issues    Started on Medication that Caused Weight Gain    Eating Wrong Types of Food    Eating  Too Much    Lack of Exercise    Stress   I have tried the following methods to lose weight Watching Portions or Calories    Medications    Meal Replacements   My lowest weight since age 17 was 160   My highest weight since age 17 was 210   The most weight I have ever lost was (lbs) 20   I have the following family history of obesity/being overweight My father is overweight   How has your weight changed over the last year? Gained   How many pounds? 30           12/10/2024     2:16 PM   Diet Recall Review with Patient   If you do eat breakfast, what types of food do you eat? Pastry   If you do eat lunch, what types of food do you typically eat? naldo Cha   How many cans/bottles of diet pop/soda/tea or sports drink do you drink in a day? 1   How often do you have a drink of alcohol? 2-4 Times a Month   If you do drink, how many drinks might you have in a day? 1 or 2           12/10/2024     2:16 PM   Eating Habits   Generally, my meals include foods like these bread, pasta, rice, potatoes, corn, crackers, sweet dessert, pop, or juice Almost Everyday   Generally, my meals include foods like these fried meats, brats, burgers, french fries, pizza, cheese, chips, or ice cream Once a Week   Eat fast food (like McDonalds, Burger Pa, Taco Bell) A Few Times a Week   Eat at a buffet or sit-down restaurant Less Than Weekly   Eat most of my meals in front of the TV or computer Everyday   Often skip meals, eat at random times, have no regular eating times Almost Everyday   Rarely sit down for a meal but snack or graze throughout A Few Times a Week   Eat extra snacks between meals A Few Times a Week   Eat most of my food at the end of the day Once a Week   Eat in the middle of the night or wake up at night to eat Less Than Weekly   Eat extra snacks to prevent or correct low blood sugar Never   Eat to prevent acid reflux or stomach pain Less Than Weekly   Worry about not having enough food to eat Never   I eat when I am  depressed A Few Times a Week   I eat when I am stressed A Few Times a Week   I eat when I am bored Once a Week   I eat when I am anxious A Few Times a Week   I eat when I am happy or as a reward Less Than Weekly   I feel hungry all the time even if I just have eaten Less Than Weekly   Feeling full is important to me A Few Times a Week   I finish all the food on my plate even if I am already full Less Than Weekly   I can't resist eating delicious food or walk past the good food/smell Never   I eat/snack without noticing that I am eating Never   I eat when I am preparing the meal Never   I eat more than usual when I see others eating Never   I have trouble not eating sweets, ice cream, cookies, or chips if they are around the house Everyday   I think about food all day Never   Please list any other foods you crave? Ice cream           12/10/2024     2:16 PM   Amount of Food   I feel out of control when eating Monthly   I eat a large amount of food, like a loaf of bread, a box of cookies, a pint/quart of ice cream, all at once Weekly   I eat a large amount of food even when I am not hungry Monthly   I eat rapidly Weekly   I eat alone because I feel embarrassed and do not want others to see how much I have eaten Never   I eat until I am uncomfortably full Monthly   I feel bad, disgusted, or guilty after I overeat Monthly           12/10/2024     2:16 PM   Activity/Exercise History   How much of a typical 12 hour day do you spend sitting? Half the Day   How much of a typical 12 hour day do you spend lying down? Less Than Half the Day   How much of a typical day do you spend walking/standing? Less Than Half the Day   How many hours (not including work) do you spend on the TV/Video Games/Computer/Tablet/Phone? 6 Hours or More   How many times a week are you active for the purpose of exercise? Never   What keeps you from being more active? Lack of Time    Too tired       PAST MEDICAL HISTORY:  Past Medical History:    Diagnosis Date    Depressive disorder     Laceration of right index finger 08/11/2023           12/10/2024     2:16 PM   Work/Social History Reviewed With Patient   My employment status is Student   How much of your job is spent on the computer or phone? 75%   How many hours do you spend commuting to work daily? 40min   What is your marital status? /In a Relationship   If in a relationship, is your significant other overweight? No       Marijuana use - once a month at most   Alcohol use -once a week, one drink per sitting   Caffeine use - diet pop             12/10/2024     2:16 PM   Mental Health History Reviewed With Patient   Have you ever been physically or sexually abused? No   How often in the past 2 weeks have you felt little interest or pleasure in doing things? For Several Days   Over the past 2 weeks how often have you felt down, depressed, or hopeless? For Several Days             12/10/2024     2:16 PM   Sleep History Reviewed With Patient   How many hours do you sleep at night? 9         MEDICATIONS:   Current Outpatient Medications   Medication Sig Dispense Refill    ARIPiprazole (ABILIFY) 2 MG tablet Take 1 tablet by mouth as directed; for 1 week, then D/C*      famotidine (PEPCID) 20 MG tablet Take 1 tablet (20 mg) by mouth 2 times daily. 60 tablet 2    Blood Pressure Monitoring (BLOOD PRESSURE KIT) KIT 1 kit 2 times daily. 1 kit 1    buPROPion (WELLBUTRIN XL) 150 MG 24 hr tablet TAKE 1 TABLET BY MOUTH DAILY (WITH 300 MG FOR A TOTAL  MG)      buPROPion (WELLBUTRIN XL) 300 MG 24 hr tablet       cholecalciferol (VITAMIN D3) 125 mcg (5000 units) capsule       DULoxetine (CYMBALTA) 60 MG capsule Take 60 mg by mouth 2 times daily.      ferrous sulfate (FEROSUL) 325 (65 Fe) MG tablet Take 1 tablet (325 mg) by mouth every other day. 60 tablet 1    hydrOXYzine (ATARAX) 25 MG tablet AS DIRECTED 1TAB IN AM OR AFTERNOON AS NEEDED FOR ANXIETY &2-4TABS AT BEDTIME AS NEEDED FOR INSOMNIA       "lamoTRIgine (LAMICTAL) 100 MG tablet Take 200 mg by mouth daily.      magnesium oxide (MAG-OX) 400 MG tablet Take 1 tablet (400 mg) by mouth daily. 60 tablet 3    metFORMIN (GLUCOPHAGE) 500 MG tablet Take 500 mg by mouth daily (with breakfast).      multivitamin w/minerals (MULTI-VITAMIN) tablet Take 1 tablet by mouth daily      traZODone (DESYREL) 100 MG tablet Take 200 mg by mouth at bedtime.             ALLERGIES:   Allergies   Allergen Reactions    Ibuprofen Rash           12/10/2024     2:01 PM   SYLWIA Score (Last Two)   SYLWIA Raw Score 31    Activation Score 59.3    SYWLIA Level 3        Patient-reported               Objective    Ht 1.702 m (5' 7\")   Wt 93 kg (205 lb)   BMI 32.11 kg/m    Vitals - Patient Reported  Pain Score: No Pain (0)      Vitals:  No vitals were obtained today due to virtual visit.    Physical Exam   GENERAL: alert and no distress  EYES: Eyes grossly normal to inspection.  No discharge or erythema, or obvious scleral/conjunctival abnormalities.  RESP: No audible wheeze, cough, or visible cyanosis.    SKIN: Visible skin clear. No significant rash, abnormal pigmentation or lesions.  NEURO: Cranial nerves grossly intact.  Mentation and speech appropriate for age.  PSYCH: Appropriate affect, tone, and pace of words     Anti-obesity medication ROS:    HEENT  Hx of glaucoma: No    Cardiovascular  CAD:No  HTN:No      Gastrointestinal  GERD:Yes every other day, unsure of triggers  Constipation:No  Liver Dz:No  H/O Pancreatitis:No    Psychiatric  Bipolar: No  Anxiety:Yes  Depression:Yes  History of alcohol/drug abuse: No  Hx of eating disorder: remote history of intermittent purging at most once a month, , estimates he's only purged 4 times in the last 8 years, last purged 1 year ago     Endocrine  Personal or family hx of MTC or MEN2:No  Diabetes/prediabetes: Yes prediabetes     Neurologic:  Hx of seizures: No  Hx of migraines: No  Memory Impairment:  maybe more forgetful lately, but still feels he " has a good memory   CVA history: No      History of kidney stones: No  Kidney disease: No      Taking Opioid/Narcotic: No        Sincerely,    Terri Gilman PA-C     The longitudinal plan of care for the diagnosis(es)/condition(s) as documented were addressed during this visit. Due to the added complexity in care, I will continue to support Kranthi in the subsequent management and with ongoing continuity of care.

## 2024-12-11 ENCOUNTER — VIRTUAL VISIT (OUTPATIENT)
Dept: ENDOCRINOLOGY | Facility: CLINIC | Age: 29
End: 2024-12-11
Payer: COMMERCIAL

## 2024-12-11 VITALS — BODY MASS INDEX: 32.18 KG/M2 | HEIGHT: 67 IN | WEIGHT: 205 LBS

## 2024-12-11 DIAGNOSIS — E66.811 CLASS 1 OBESITY WITH SERIOUS COMORBIDITY AND BODY MASS INDEX (BMI) OF 32.0 TO 32.9 IN ADULT, UNSPECIFIED OBESITY TYPE: Primary | ICD-10-CM

## 2024-12-11 DIAGNOSIS — R63.5 WEIGHT GAIN: ICD-10-CM

## 2024-12-11 DIAGNOSIS — R73.03 PREDIABETES: ICD-10-CM

## 2024-12-11 DIAGNOSIS — F32.A DEPRESSION, UNSPECIFIED DEPRESSION TYPE: ICD-10-CM

## 2024-12-11 DIAGNOSIS — K21.9 GASTROESOPHAGEAL REFLUX DISEASE WITHOUT ESOPHAGITIS: ICD-10-CM

## 2024-12-11 RX ORDER — ARIPIPRAZOLE 2 MG/1
TABLET ORAL
COMMUNITY
Start: 2024-10-22

## 2024-12-11 RX ORDER — FAMOTIDINE 20 MG/1
20 TABLET, FILM COATED ORAL 2 TIMES DAILY
Qty: 60 TABLET | Refills: 2 | Status: SHIPPED | OUTPATIENT
Start: 2024-12-11

## 2024-12-11 ASSESSMENT — PAIN SCALES - GENERAL: PAINLEVEL_OUTOF10: NO PAIN (0)

## 2024-12-11 NOTE — PROGRESS NOTES
Virtual Visit Details    Type of service:  Video Visit     Originating Location (pt. Location): Home    Distant Location (provider location):  Off-site  Platform used for Video Visit: Beatris

## 2024-12-11 NOTE — NURSING NOTE
Current patient location: Patient declined to provide     Is the patient currently in the state of [place]? YES    Visit mode:VIDEO    If the visit is dropped, the patient can be reconnected by:VIDEO VISIT: Send to e-mail at: [email or web address]    Will anyone else be joining the visit? NO  (If patient encounters technical issues they should call [phone]    Are changes needed to the allergy or medication list? No    Are refills needed on medications prescribed by this physician? NO    Rooming Documentation:  Questionnaire(s) completed    Reason for visit: Consult    [facility]

## 2024-12-11 NOTE — PATIENT INSTRUCTIONS
"Thank you for allowing us the privilege of caring for you. We hope we provided you with the excellent service you deserve.   Please let us know if there is anything else we can do for you so that we can be sure you are completely satisfied with your care experience.    To ensure the quality of our services you may be receiving a patient satisfaction survey from an independent patient satisfaction monitoring company.    The greatest compliment you can give is a \"Likely to Recommend\"    Your visit was with Terri Gilman PA-C today.    Instructions per today's visit:     Kd Arellano You Reba, it was great to visit with you today.  Here is a review of our visit.  If our clinic scheduler is not able to reach you please call [phone] to schedule your next appointments.    Plan  Start famotidine 20mg twice daily for heart burn symptoms   No weight loss meds started today due to Kranthi's preference to focus on conservative measures at this time. Discussed risks, benefits, and details of wegovy, could start this in between appointments via [employer] if Kranthi is interested   Goals we discussed today:   Track food prior to meeting with dietician, popular apps are loseit, myfitness pal   Labs ordered today: new [facility] labs   Follow up with Terri in 3 months   Referral placed to mental health provider as depression and issues with motivation seem to a persistent obstacle for Kranthi in habit changes, mental health   Dietician appointment scheduled 1/2/25        Information about Video Visits with [employer]: video visit information  _________________________________________________________________________________________________________________________________________________________  If you are asked by your clinic team to have your blood pressure checked:  [facility] do offer several locations for blood pressure checks. Please follow the below link to schedule an appointment. Scheduling an appointment at the " pharmacy for a blood pressure check is now preferred.    Appointment Plus (appointment-plus.S[employer]Culture)  _________________________________________________________________________________________________________________________________________________________  Important contact and scheduling information:  Please call our contact center at [phone] to schedule your next appointments.  To find a lab location near you, please call [phone].  For any nursing questions or concerns call France Garcia LPN at [phone] or Coty Mcmahon RN at [phone]  Please call during clinic hours Monday through Friday 8:00a - 4:00p if you have questions or you can contact us via [employer] at anytime and we will reply during clinic hours.    Lab results will be communicated through My Chart or letter (if My Chart not used). Please call the clinic if you have not received communication after 1 week or if you have any questions.?  Clinic Fax: [phone]      [facility] Get Moving Program  Our team of physical therapists is trained to help you understand and take control of your condition. They will perform a thorough evaluation to determine your ability for activity and develop a customized plan to fit your goals and physical ability.  Scheduling: Unsure if the Get Moving program is right for you? Discuss the program with your medical provider or diabetes educator. You can also call us at [phone] to ask questions or schedule an appointment.   [facility] Get Moving Program  ____________________________________________________________________________________________________________________________________________________________________________  [facility] Diabetes Prevention Program (DPP)  If you have prediabetes and Medicare please contact us via [employer] to learn more about the Diabetes Prevention Program (DPP)  Program Details:   [facility] [place] offers the year-long Diabetes Prevention Program (DPP). The  program helps you to make lifestyle changes that prevent or delay type 2 diabetes by supporting healthy eating, increased physical activity, stress reduction and use of coping skills.   On average, previous [facility] DPP cohorts have lost and maintained at least 5% of their starting weight throughout the program and averaged more than 150 minutes of physical activity per week.  Participants meet weekly for one-hour group sessions over sixteen weeks, every other week for the next 8 weeks, and monthly for the last six months.   A year-long maintenance program is also available for participants who complete the first year.   Location & Cost:   During the COVID-19 Public Health Emergency, the program is offered virtually. When in-person classes can resume, they will be held at [facility].  For people with Medicare, the program is covered in full. A self-pay option will also be available for those with non-Medicare insurance plans.   ______________________________________________________________________________________________________________________________________________________________________________________________________________________________    To work with a Behavioral Health Psychologist:    Call to schedule:    Malcolm Griffiths - [phone]  Cole Ramachandran - [phone]  Olivia Terrazas - [phone]  Bre Cui - [phone]   Sudha Hughes PhD (cannot accept Medicare) [phone]        Thank you,   [facility] Comprehensive Weight Management Team

## 2024-12-11 NOTE — LETTER
"2024       RE: Adan Britton  [address] [facility] [id number]     Dear Colleague,    Thank you for referring your patient, Adan Britton, to the [facility]. Please see a copy of my visit note below.      43 minutes spent by me on the date of the encounter doing chart review, history and exam, documentation and further activities per the note    New Medical Weight Management Consult    PATIENT:  Adan Britotn  MRN:         [id number]  :         1995  RACHEL:         2024    Dear PCP,    I had the pleasure of seeing your patient, Adan Britton. Full intake/assessment was done to determine barriers to weight loss success and develop a treatment plan. Adan Britton is a 29 year old male interested in treatment of medical problems associated with excess weight. He has a height of 5' 7\", a weight of 205 lbs 0 oz, and the calculated Body mass index is 32.11 kg/m .      Appointment was cut 25 minutes short than ideal as Kranthi had to go back to work      Assessment & Plan   Problem List Items Addressed This Visit       Prediabetes    Relevant Medications    famotidine (PEPCID) 20 MG tablet    Other Relevant Orders    Parathyroid Hormone Intact    Vitamin D Deficiency    Comprehensive metabolic panel    Hemoglobin A1c    Adult Mental Health  Referral     Other Visit Diagnoses       Class 1 obesity with serious comorbidity and body mass index (BMI) of 32.0 to 32.9 in adult, unspecified obesity type    -  Primary    Relevant Medications    famotidine (PEPCID) 20 MG tablet    Other Relevant Orders    Parathyroid Hormone Intact    Vitamin D Deficiency    Comprehensive metabolic panel    Hemoglobin A1c    Adult Mental Health  Referral    Weight gain        Depression, unspecified depression type        Relevant Orders    Adult Mental Health  Referral    Gastroesophageal reflux " disease without esophagitis        Relevant Medications    famotidine (PEPCID) 20 MG tablet             Plan  Start famotidine 20mg twice daily for heart burn symptoms   No weight loss meds started today due to Kranthi's preference to focus on conservative measures at this time. Discussed risks, benefits, and details of wegovy, could start this in between appointments via mychart if Kranthi is interested   Goals we discussed today:   Track food prior to meeting with dietician, popular apps are loseit, myHab Housing pal   Labs ordered today: new [facility] labs   Follow up with Terri in 3 months   Referral placed to mental health provider as depression and issues with motivation seem to a persistent obstacle for Kranthi in habit changes, mental health   Dietician appointment scheduled 1/2/25             Potential anti-obesity medications for this patient the future if interested in starting meds   WEGOVY  No history of pancreatitis   No personal or family history of medullary thyroid carcinoma  No personal or family history of Multiple Endocrine Neoplasia Type 2  Caution would be needed with this medication due to GERD at baseline, will start famotidine daily for this. Also caution needed due to depression, would monitor for change in symptoms. Further caution needed due to remote history of purging at most once a month after episode of overeating, Kranthi has not done this in a year and denies urges towards purging at this time. Would monitor closely.     . Side effects and risks associated with this medication, as well as medication administration instructions, were discussed. Patient expressed understanding and a willingness to proceed with starting this medication.     NALTREXONE  Did not discuss, could consider in the future   Would see synergistic effect with bupropion that patient is already taking  No chronic pain   No current opioid use   Caution is needed with this medication due to elevated LFTs recently, would  "want to confirm stable levels prior to starting   .      The following medications could be considered with caution for this patient   PHENTERMINE  Did not discuss, could consider in the future   No history of hypertension  No history of CVA   No history of cardiovascular disease   No history of cardiac arrhythmias   No history of glaucoma  No history of drug abuse  No history of hyperthyroidism  Caution would be needed with this medication due to depression, would want to confirm with psychiatrist prior to starting   .  TOPIRAMATE  Did not discuss, could consider in the future   No history of kidney stones  No history of glaucoma   No issues with memory  No chronic kidney disease   Caution would be needed with this medication due to depression, would want to confirm with psych provider prior to trying   .         Adan Britton is a 29 year old male who presents to clinic today for the following health issues.     He has the following co-morbidities:        12/10/2024     2:16 PM   --   I have the following health issues associated with obesity Pre-Diabetes    High Cholesterol   I have the following symptoms associated with obesity Depression    Fatigue            No data to display                    12/10/2024     2:16 PM   Referring Provider   Please name the provider who referred you to Medical Weight Management  If you do not know, please answer \"I Don't Know\" Merly Dunbarwski     Overweight onset as an adolescent, age [age] was 70-80kg (155 to 176lbs).  Recalls maintaining weight between 170- 180lbs for the next 9 years. Age 28 saw more rapid weight gain despite not changing many eating habits, weight gain from 180lbs to 212 which was highest weight in life, saw unexplained drop in weight summer 2024 to 180lbs, but then weight regain to 207lbs which is where he is today.     Of note: ages [age] would induce vomiting about once a month with the goal of maintaining weight after overeating. Stopped doing this for " 8 years, estimates he has done this maybe 4 times over the last couple of years. Last purged about 1 year ago. Worked with therapist on this. Does not currently feel an urge to induce purging.       Comorbidities associated with weight gain include history  prediabetes (most recent A1c 5.6, down from 5.7), dyslipidemia.   Additional health issues include chronic fatigue, depression (works with psych med provider).     Motivators for weight loss include improve self esteem, improve health, improve life expectancy.     He is interested in starting a medication as a tool for working towards sustainable weight loss.      Regarding eating patterns and diet, he  typically eats 2-4 meals a day.  Is able to get full. Can stay full until next meal. Does not  struggle with portion control. Does at times experience food noise. Might experience emotional eating once every 2-3 months (stress). At times experiences a loss of control around eating, for example if eating ice cream will tend to eat a full pint.     Eats out/ gets take out 7 a week. Drinks starbucks coffee most mornings- mocha or flavored latte with oatmilk. Diet soda- 1-2 bottles diet coke or diet pepsi or diet doctor melly. Lost his water bottle a few months ago, prior to this was drinking water more regularly- would be open to buying a new water bottle. ETOH once a week or less, 1 drink in a sitting.     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 No structured exercise currently, struggles to exercise due to issues with motivation with mental health. Previously was walking more regularly. Did bouldering in the past, enjoyed this, struggling to find motivation to do this.       Current AOMs   Metformin- 500mg- to help with counteracting metabolic effects of other anti depressant meds. Denies side effects.    Wellbutrin 450mg- to help with depression, weight loss. Unsure if it's helping with weight  loss.             12/10/2024     2:16 PM   Weight History   How concerned are you about your weight? Somewhat Concerned   I became overweight As an Adult   The following factors have contributed to my weight gain Mental Health Issues    Started on Medication that Caused Weight Gain    Eating Wrong Types of Food    Eating Too Much    Lack of Exercise    Stress   I have tried the following methods to lose weight Watching Portions or Calories    Medications    Meal Replacements   My lowest weight since age 17 was 160   My highest weight since age 17 was 210   The most weight I have ever lost was (lbs) 20   I have the following family history of obesity/being overweight My father is overweight   How has your weight changed over the last year? Gained   How many pounds? 30           12/10/2024     2:16 PM   Diet Recall Review with Patient   If you do eat breakfast, what types of food do you eat? Pastry   If you do eat lunch, what types of food do you typically eat? naldo Cha   How many cans/bottles of diet pop/soda/tea or sports drink do you drink in a day? 1   How often do you have a drink of alcohol? 2-4 Times a Month   If you do drink, how many drinks might you have in a day? 1 or 2           12/10/2024     2:16 PM   Eating Habits   Generally, my meals include foods like these bread, pasta, rice, potatoes, corn, crackers, sweet dessert, pop, or juice Almost Everyday   Generally, my meals include foods like these fried meats, brats, burgers, french fries, pizza, cheese, chips, or ice cream Once a Week   Eat fast food (like McDonalds, Burger Pa, Taco Bell) A Few Times a Week   Eat at a buffet or sit-down restaurant Less Than Weekly   Eat most of my meals in front of the TV or computer Everyday   Often skip meals, eat at random times, have no regular eating times Almost Everyday   Rarely sit down for a meal but snack or graze throughout A Few Times a Week   Eat extra snacks between meals A Few Times a Week   Eat  most of my food at the end of the day Once a Week   Eat in the middle of the night or wake up at night to eat Less Than Weekly   Eat extra snacks to prevent or correct low blood sugar Never   Eat to prevent acid reflux or stomach pain Less Than Weekly   Worry about not having enough food to eat Never   I eat when I am depressed A Few Times a Week   I eat when I am stressed A Few Times a Week   I eat when I am bored Once a Week   I eat when I am anxious A Few Times a Week   I eat when I am happy or as a reward Less Than Weekly   I feel hungry all the time even if I just have eaten Less Than Weekly   Feeling full is important to me A Few Times a Week   I finish all the food on my plate even if I am already full Less Than Weekly   I can't resist eating delicious food or walk past the good food/smell Never   I eat/snack without noticing that I am eating Never   I eat when I am preparing the meal Never   I eat more than usual when I see others eating Never   I have trouble not eating sweets, ice cream, cookies, or chips if they are around the house Everyday   I think about food all day Never   Please list any other foods you crave? Ice cream           12/10/2024     2:16 PM   Amount of Food   I feel out of control when eating Monthly   I eat a large amount of food, like a loaf of bread, a box of cookies, a pint/quart of ice cream, all at once Weekly   I eat a large amount of food even when I am not hungry Monthly   I eat rapidly Weekly   I eat alone because I feel embarrassed and do not want others to see how much I have eaten Never   I eat until I am uncomfortably full Monthly   I feel bad, disgusted, or guilty after I overeat Monthly           12/10/2024     2:16 PM   Activity/Exercise History   How much of a typical 12 hour day do you spend sitting? Half the Day   How much of a typical 12 hour day do you spend lying down? Less Than Half the Day   How much of a typical day do you spend walking/standing? Less Than Half  the Day   How many hours (not including work) do you spend on the TV/Video Games/Computer/Tablet/Phone? 6 Hours or More   How many times a week are you active for the purpose of exercise? Never   What keeps you from being more active? Lack of Time    Too tired       PAST MEDICAL HISTORY:  Past Medical History:   Diagnosis Date     Depressive disorder      Laceration of right index finger 08/11/2023           12/10/2024     2:16 PM   Work/Social History Reviewed With Patient   My employment status is Student   How much of your job is spent on the computer or phone? 75%   How many hours do you spend commuting to work daily? 40min   What is your marital status? /In a Relationship   If in a relationship, is your significant other overweight? No       Marijuana use - once a month at most   Alcohol use -once a week, one drink per sitting   Caffeine use - diet pop             12/10/2024     2:16 PM   Mental Health History Reviewed With Patient   Have you ever been physically or sexually abused? No   How often in the past 2 weeks have you felt little interest or pleasure in doing things? For Several Days   Over the past 2 weeks how often have you felt down, depressed, or hopeless? For Several Days             12/10/2024     2:16 PM   Sleep History Reviewed With Patient   How many hours do you sleep at night? 9         MEDICATIONS:   Current Outpatient Medications   Medication Sig Dispense Refill     ARIPiprazole (ABILIFY) 2 MG tablet Take 1 tablet by mouth as directed; for 1 week, then D/C*       famotidine (PEPCID) 20 MG tablet Take 1 tablet (20 mg) by mouth 2 times daily. 60 tablet 2     Blood Pressure Monitoring (BLOOD PRESSURE KIT) KIT 1 kit 2 times daily. 1 kit 1     buPROPion (WELLBUTRIN XL) 150 MG 24 hr tablet TAKE 1 TABLET BY MOUTH DAILY (WITH 300 MG FOR A TOTAL  MG)       buPROPion (WELLBUTRIN XL) 300 MG 24 hr tablet        cholecalciferol (VITAMIN D3) 125 mcg (5000 units) capsule        DULoxetine  "(CYMBALTA) 60 MG capsule Take 60 mg by mouth 2 times daily.       ferrous sulfate (FEROSUL) 325 (65 Fe) MG tablet Take 1 tablet (325 mg) by mouth every other day. 60 tablet 1     hydrOXYzine (ATARAX) 25 MG tablet AS DIRECTED 1TAB IN AM OR AFTERNOON AS NEEDED FOR ANXIETY &2-4TABS AT BEDTIME AS NEEDED FOR INSOMNIA       lamoTRIgine (LAMICTAL) 100 MG tablet Take 200 mg by mouth daily.       magnesium oxide (MAG-OX) 400 MG tablet Take 1 tablet (400 mg) by mouth daily. 60 tablet 3     metFORMIN (GLUCOPHAGE) 500 MG tablet Take 500 mg by mouth daily (with breakfast).       multivitamin w/minerals (MULTI-VITAMIN) tablet Take 1 tablet by mouth daily       traZODone (DESYREL) 100 MG tablet Take 200 mg by mouth at bedtime.             ALLERGIES:   Allergies   Allergen Reactions     Ibuprofen Rash           12/10/2024     2:01 PM   SYLWIA Score (Last Two)   SYLWIA Raw Score 31    Activation Score 59.3    SYLWIA Level 3        Patient-reported               Objective    Ht 1.702 m (5' 7\")   Wt 93 kg (205 lb)   BMI 32.11 kg/m    Vitals - Patient Reported  Pain Score: No Pain (0)      Vitals:  No vitals were obtained today due to virtual visit.    Physical Exam   GENERAL: alert and no distress  EYES: Eyes grossly normal to inspection.  No discharge or erythema, or obvious scleral/conjunctival abnormalities.  RESP: No audible wheeze, cough, or visible cyanosis.    SKIN: Visible skin clear. No significant rash, abnormal pigmentation or lesions.  NEURO: Cranial nerves grossly intact.  Mentation and speech appropriate for age.  PSYCH: Appropriate affect, tone, and pace of words     Anti-obesity medication ROS:    HEENT  Hx of glaucoma: No    Cardiovascular  CAD:No  HTN:No      Gastrointestinal  GERD:Yes every other day, unsure of triggers  Constipation:No  Liver Dz:No  H/O Pancreatitis:No    Psychiatric  Bipolar: No  Anxiety:Yes  Depression:Yes  History of alcohol/drug abuse: No  Hx of eating disorder: remote history of intermittent purging " at most once a month, , estimates he's only purged 4 times in the last 8 years, last purged 1 year ago     Endocrine  Personal or family hx of MTC or MEN2:No  Diabetes/prediabetes: Yes prediabetes     Neurologic:  Hx of seizures: No  Hx of migraines: No  Memory Impairment:  maybe more forgetful lately, but still feels he has a good memory   CVA history: No      History of kidney stones: No  Kidney disease: No      Taking Opioid/Narcotic: No        Sincerely,    Terri Gilman PA-C     The longitudinal plan of care for the diagnosis(es)/condition(s) as documented were addressed during this visit. Due to the added complexity in care, I will continue to support Kranthi in the subsequent management and with ongoing continuity of care.     Virtual Visit Details    Type of service:  Video Visit     Originating Location (pt. Location): Home    Distant Location (provider location):  Off-site  Platform used for Video Visit: Beatris      Again, thank you for allowing me to participate in the care of your patient.      Sincerely,    Terri Gilman PA-C

## 2024-12-21 ENCOUNTER — MYC MEDICAL ADVICE (OUTPATIENT)
Dept: FAMILY MEDICINE | Facility: CLINIC | Age: 29
End: 2024-12-21
Payer: COMMERCIAL

## 2024-12-30 NOTE — TELEPHONE ENCOUNTER
"FUTURE VISIT INFORMATION      FUTURE VISIT INFORMATION:  Date: 4/16/25  Time: 2:50pm  [place] [facility] [place]FORMATION:  Referring provider:  Sandy Fitch NP  Referring providers clinic:   [facility]  Reason for visit/diagnosis  Per Dr Spaulding- schedule him with one of the APPs with an audio. He does not have abnormal TMs. He has wax which is obstructing the view of the TMs and ear pain, appt per pt, ref by Sandy Fitch NP     RECORDS REQUESTED FROM:       Clinic name Comments Records Status Imaging Status   [facility]  9/16/24- OV Sandy Cid NP  5/24/24- OV adan. Rony Tate, APRN CNP  [facility]             \"Please notify/message CSS if patient completed outside imaging prior to scheduled appointment and/or any outside records that might have been missed at pre visit -Thanks\"  "

## 2025-01-02 ENCOUNTER — VIRTUAL VISIT (OUTPATIENT)
Dept: ENDOCRINOLOGY | Facility: CLINIC | Age: 30
End: 2025-01-02
Payer: COMMERCIAL

## 2025-01-02 VITALS — BODY MASS INDEX: 32.18 KG/M2 | HEIGHT: 67 IN | WEIGHT: 205 LBS

## 2025-01-02 DIAGNOSIS — E66.811 CLASS 1 OBESITY WITH SERIOUS COMORBIDITY AND BODY MASS INDEX (BMI) OF 32.0 TO 32.9 IN ADULT, UNSPECIFIED OBESITY TYPE: ICD-10-CM

## 2025-01-02 DIAGNOSIS — Z71.3 NUTRITIONAL COUNSELING: Primary | ICD-10-CM

## 2025-01-02 ASSESSMENT — PAIN SCALES - GENERAL: PAINLEVEL_OUTOF10: NO PAIN (0)

## 2025-01-02 ASSESSMENT — PATIENT HEALTH QUESTIONNAIRE - PHQ9: SUM OF ALL RESPONSES TO PHQ QUESTIONS 1-9: 7

## 2025-01-02 NOTE — LETTER
"2025       RE: Adan Britton  [address] [address]     Dear Colleague,    Thank you for referring your patient, Adan Britton, to the [facility] [place] at [facility]. Please see a copy of my visit note below.    Video-Visit Details    Type of service:  Video Visit    Video Start Time: 11:31 AM    Video End Time: 12:01 PM     Originating Location (pt. Location): Home    Distant Location (provider location):  Offsite (providers home) [facility] [place]     Platform used for Video Visit: [employer]    New Weight Management Nutrition Consultation    Adan Britton is a 29 year old male presents today for new weight management nutrition consultation.  Patient referred by MONICO Herrera on 2024.    Patient with Co-morbidities of obesity includin/10/2024     2:16 PM   --   I have the following health issues associated with obesity Pre-Diabetes    High Cholesterol   I have the following symptoms associated with obesity Depression    Fatigue     Hx of induced vomiting.  Ages 16-20 would induce vomiting about once a month with the goal of maintaining weight after overeating. Stopped doing this for 8 years, estimates he has done this maybe 4 times over the last couple of years. Last purged about 1 year ago. Worked with therapist on this. Does not currently feel an urge to induce purging.     Anthropometrics:  Initial consult weight: 205 lb on 24   Estimated body mass index is 32.11 kg/m  as calculated from the following:    Height as of this encounter: 1.702 m (5' 7\").    Weight as of this encounter: 93 kg (205 lb).  Current Weight: 205 lb per patient report      Medications for Weight Loss:  No meds at this time     NUTRITION HISTORY  Food allergies: None   Food intolerances: lactose intolerant  - uses Lactase   Vitamin/Mineral Supplements: MVI, Vitamin D, Iron "   Previous methods of diet modification for weight loss: intermittent fasting   RD before: No    Typically eats 2-4 meals a day.  Is able to get full. Can stay full until next meal. Does not  struggle with portion control. Does at times experience food noise. Might experience emotional eating once every 2-3 months (stress). At times experiences a loss of control around eating, for example if eating ice cream will tend to eat a full pint. Gets take out 7 days a week.     In school right now. Has been trying to prepare more meals at home. Wants to have low effort meals. Has been boiling a lot vegetables.     Diet recall:   Eating 3 times a day.   Breakfast - pastry + sweetened coffee OR savory breakfast   Lunch - 4-5 bananas for lunch   Dinner - takeout   Snacks - bananas, sweetened nuts, sweetened popcorn, roasted peanuts   Hydration: Drinks starbucks coffee most mornings - mocha or flavored latte with oatmilk. Diet soda - 1-2 bottles daily.          12/10/2024     2:16 PM   Diet Recall Review with Patient   If you do eat breakfast, what types of food do you eat? Pastry   If you do eat lunch, what types of food do you typically eat? Semaj, naldo   How many cans/bottles of diet pop/soda/tea or sports drink do you drink in a day? 1   How often do you have a drink of alcohol? 2-4 Times a Month   If you do drink, how many drinks might you have in a day? 1 or 2           12/10/2024     2:16 PM   Eating Habits   Generally, my meals include foods like these bread, pasta, rice, potatoes, corn, crackers, sweet dessert, pop, or juice Almost Everyday   Generally, my meals include foods like these fried meats, brats, burgers, french fries, pizza, cheese, chips, or ice cream Once a Week   Eat fast food (like McDonalds, Burger Pa, Taco Bell) A Few Times a Week   Eat at a buffet or sit-down restaurant Less Than Weekly   Eat most of my meals in front of the TV or computer Everyday   Often skip meals, eat at random times, have  no regular eating times Almost Everyday   Rarely sit down for a meal but snack or graze throughout A Few Times a Week   Eat extra snacks between meals A Few Times a Week   Eat most of my food at the end of the day Once a Week   Eat in the middle of the night or wake up at night to eat Less Than Weekly   Eat extra snacks to prevent or correct low blood sugar Never   Eat to prevent acid reflux or stomach pain Less Than Weekly   Worry about not having enough food to eat Never   I eat when I am depressed A Few Times a Week   I eat when I am stressed A Few Times a Week   I eat when I am bored Once a Week   I eat when I am anxious A Few Times a Week   I eat when I am happy or as a reward Less Than Weekly   I feel hungry all the time even if I just have eaten Less Than Weekly   Feeling full is important to me A Few Times a Week   I finish all the food on my plate even if I am already full Less Than Weekly   I can't resist eating delicious food or walk past the good food/smell Never   I eat/snack without noticing that I am eating Never   I eat when I am preparing the meal Never   I eat more than usual when I see others eating Never   I have trouble not eating sweets, ice cream, cookies, or chips if they are around the house Everyday   I think about food all day Never   Please list any other foods you crave? Ice cream           12/10/2024     2:16 PM   Amount of Food   I feel out of control when eating Monthly   I eat a large amount of food, like a loaf of bread, a box of cookies, a pint/quart of ice cream, all at once Weekly   I eat a large amount of food even when I am not hungry Monthly   I eat rapidly Weekly   I eat alone because I feel embarrassed and do not want others to see how much I have eaten Never   I eat until I am uncomfortably full Monthly   I feel bad, disgusted, or guilty after I overeat Monthly     Physical Activity:  No structured exercise currently, struggles to exercise due to issues with motivation with  mental health. Previously was walking more regularly. Did bouldering in the past, enjoyed this, struggling to find motivation to do this.         12/10/2024     2:16 PM   Activity/Exercise History   How much of a typical 12 hour day do you spend sitting? Half the Day   How much of a typical 12 hour day do you spend lying down? Less Than Half the Day   How much of a typical day do you spend walking/standing? Less Than Half the Day   How many hours (not including work) do you spend on the TV/Video Games/Computer/Tablet/Phone? 6 Hours or More   How many times a week are you active for the purpose of exercise? Never   What keeps you from being more active? Lack of Time    Too tired     Nutrition Prescription  Recommended energy/nutrient modification.    Nutrition Diagnosis  Obesity r/t long history of positive energy balance aeb BMI >30.    Nutrition Intervention  Materials/education provided on hypocaloric diet for weight loss. Discussed incorporating more protein and fiber in your diet. Volumetric eating to help satiety level on fewer calories; portion control and healthy food choices (Plate Method and Volumetrics handouts), lower calorie snack choices, meal and snack planning tips and resources. Patient demonstrates understanding. Co-developed goals to work towards.   Provided pt with list of goals and resources below via Insighterat.     Expected Engagement: good    Nutrition Goals  1) Have a consistent eating schedule. Try to aim for at least 3 meals daily.   2) Ensure you have a good protein source at each meal. Aim for at least 60-90 grams of protein daily.   3) Increase fiber rich foods in your diet by having more fruits, vegetables, and whole grains.   4) Use the plate method as a guide for balanced meals.   5) Try to reduce sugar/calorie containing beverages and drink more water.     Faster meal component ideas:   Protein   Crockpot chicken  Rotisserie chicken  Deli meat  Ready made meat from store ([employer],  [employer] & [employer]ather)  Cottage cheese  Greek yogurt  String cheese  Scrambled/hard boiled eggs  Canned tuna/chicken/salmon (or pouches)  Frozen, already cooked shrimp  Nuts/Seeds (handful)  Nut Butter (2 tbsp)  Grains  Minute grain cups (lentils, rice, quinoa)   90 second grain pouches  Whole grain bread (i.e. Nestor bread, etc.)  Carb balance tortillas  Quick oats  Whole wheat crackers  Fruit  Apples  Bananas  Peaches  Pears  Plums  Grapes  Berries (strawberries, blueberries, raspberries, blackberries)  Cherries  Frozen fruit  Fruit canned in 100% fruit juice  Vegetables  Mini cucumbers  Baby carrots  Mini bell peppers  Snap peas  Pre-cut vegetables (i.e. broccoli, brussels sprouts, etc.)  Frozen vegetables (steam in bag)  Canned vegetables (recommend low sodium options)  Other   Hemp hearts, everton, and other seeds      Five Food Groups Pantry List  Vegetables: Keep a variety of canned tomatoes in stock (diced, crushed, whole, stewed). Use them in soups, stews, sauces, casseroles and more! Also,  a jar of your favorite pasta sauce. Dried mushrooms are another great pantry item because they can add depth of flavor to your meals.    Fruits: Dried fruits such as raisins, dried cranberries and dried apricots provide dietary fiber. They also can add a punch of flavor to your morning breakfast, midday salad and dinner grains.    Milk and Dairy Products: Dried milk is a great back-up item to have on stock. You can use it in your coffee or tea. Boxed milk also is available in single-serving packages and is a great item for lunch boxes. Evaporated milk, available in cans in the baking aisle, can be substituted for liquid milk in most recipes.    Protein Foods: Stock up on canned or dried lentils and black, blount, cannellini, garbanzo and kidney beans. Toss cooked beans in salads, soups, stews and other dishes. Canned tuna, anchovies and sardines.    Grains: Keep a stash of oatmeal, buckwheat and other whole-grain cereals in  the pantry. For an extra boost, add nuts and fresh berries to these hot cereals. Barley, farro, quinoa and other grains are staples for healthy meals. Also, keep a variety of rice on hand. Long grain, short grain, basmati and brown rice. Spaghetti, ziti, penne and other pastas are great for an easy, quick and filling meal. Give yourself an extra nutrition boost by buying whole-grain pasta or trying pasta made from legumes.    Also, stock up on:  Condiments: Ketchup, mustard and relish can be stored in the pantry until opened. Once you open them, keep them in the fridge.  Oil and vinegar: Extra-virgin olive oil is a versatile, heart-healthy option. Other oils, such as peanut, walnut and sesame, add a burst of flavor to meals.  different types of vinegar, such as cider, white and balsamic. Each imparts a unique flavor to your recipes.  Stock: Vegetable, chicken and beef stock are the basics of many recipes. Opt for low-sodium versions or ones with no added salt.  Herbs and spices:  small containers of ground herbs and spices. That way they are as fresh as possible when you use them.  Flax and other seeds: Flax and everton seeds deliver protein, dietary fiber and omega-3 fatty acids. Add them to cereal, salads, sauces and home-baked goods. If you buy whole flaxseed, make sure you grind it up before eating so your body can absorb the nutrients.    Five Food Groups Freezer List  Vegetables:  some of your favorite frozen veggies. Look for packages without sodium. And, while you are in the produce aisle, grab some fresh herbs. When you get home, fill ice cube trays with chopped herbs, top off the herbs with boiling water, and carefully place in the freezer. Add these herb cubes as you prepare meals for a punch of freshness.    Fruits: Stash frozen berries and other fruits in the freezer.     Milk and Dairy Products: Freeze Parmesan and other pre-shredded cheeses -- toss them into soups, stews and pasta  "dishes.     Protein Foods: Stock up on salmon and other fatty fishes to ensure you have ready access to healthy fats. Lean meats and poultry also store well in the freezer. One tip: Make sure you move it to the refrigerator one day before cooking to give adequate time for defrosting. Keep a variety of nuts in the freezer. This helps prevent them from spoiling. Add them to cold cereal, salads, hot grains and other dishes.    Grains: Whole-grain corn tortillas freeze well and can be used for quick breakfasts, lunches or dinners. Can t eat that loaf of bread fast enough while it is fresh? Make it a habit to freeze part of the loaf and defrost slices as you need them.    The Plate Method  [email or web address]    Protein Sources   [email or web address]     Quick/Easy Protein Sources:  Hard boiled eggs  Part-skim cheese sticks  Baby Bell cheese rounds  Low-fat/low-sugar Greek yogurt  Low-fat cottage cheese  Lean deli meat (chicken/turkey/ham)  Roasted chickpeas or lentils  Nuts   Turkey meat stick  Protein shake/bar  \"P3\" snack (cheese, nuts, deli meat)  Aldi's \"Protein Bread\"   \"Egglife\" egg white wrap    Tuna/salmon can/packet     Carbohydrates  [email or web address]     Mindful Eating  [email or web address]     Summary of Volumetrics Eating Plan  [email or web address]     Healthier Sweet alternatives:               - Yasso bars               - Frozen yogurt ice cream alternatives               - Nature s Bakery brownie alternatives               - Could consider black bean, sweet potato or avocado based brownies               - Trail mix              - Homemade or store bought mousse              - Dessert hummus with fruit              - Chocolate covered fruit/nuts               - Yogurt covered fruit/nuts      Meal Delivery Companies  Minimal " prep:  Https://get.everyplate.com  Blueapron.com  Hungryroot.com  Hellofresh.com  HomeMithridion.Kids Calendar    Ready-to-eat:  Https://[email or web address] (meat and veggie focus for meals)  Https://Buysight/ (plant based)  [email or web address]://www.[email or web address] [email or web address] Attack.Kids Calendar/our-menu ([facility])  [email or web address]    Follow-Up: 1-2 months or as needed.     Time spent with patient: 29 minutes.  Una Jackson RD, LD      Again, thank you for allowing me to participate in the care of your patient.      Sincerely,    Una Jackson RD

## 2025-01-02 NOTE — PATIENT INSTRUCTIONS
Kd Crisostomo,     It was nice to meet you today!    Follow-up with RD in 1-2 months or as needed.     Thank you,    Una Jackson, AVIS, LD  If you would like to schedule or reschedule an appointment with the RD, please call [phone]    Nutrition Goals  1) Have a consistent eating schedule. Try to aim for at least 3 meals daily.   2) Ensure you have a good protein source at each meal. Aim for at least 60-90 grams of protein daily.   3) Increase fiber rich foods in your diet by having more fruits, vegetables, and whole grains.   4) Use the plate method as a guide for balanced meals.   5) Try to reduce sugar/calorie containing beverages and drink more water.     Faster meal component ideas:   Protein   Crockpot chicken  Rotisserie chicken  Deli meat  Ready made meat from store ([employer], [employer] G[employer])  Cottage cheese  Greek yogurt  String cheese  Scrambled/hard boiled eggs  Canned tuna/chicken/salmon (or pouches)  Frozen, already cooked shrimp  Nuts/Seeds (handful)  Nut Butter (2 tbsp)  Grains  Minute grain cups (lentils, rice, quinoa)   90 second grain pouches  Whole grain bread (i.e. Nestor bread, etc.)  Carb balance tortillas  Quick oats  Whole wheat crackers  Fruit  Apples  Bananas  Peaches  Pears  Plums  Grapes  Berries (strawberries, blueberries, raspberries, blackberries)  Cherries  Frozen fruit  Fruit canned in 100% fruit juice  Vegetables  Mini cucumbers  Baby carrots  Mini bell peppers  Snap peas  Pre-cut vegetables (i.e. broccoli, brussels sprouts, etc.)  Frozen vegetables (steam in bag)  Canned vegetables (recommend low sodium options)  Other   Hemp hearts, everton, and other seeds      Five Food Groups Pantry List  Vegetables: Keep a variety of canned tomatoes in stock (diced, crushed, whole, stewed). Use them in soups, stews, sauces, casseroles and more! Also,  a jar of your favorite pasta sauce. Dried mushrooms are another great pantry item because they can add depth of flavor to your  meals.    Fruits: Dried fruits such as raisins, dried cranberries and dried apricots provide dietary fiber. They also can add a punch of flavor to your morning breakfast, midday salad and dinner grains.    Milk and Dairy Products: Dried milk is a great back-up item to have on stock. You can use it in your coffee or tea. Boxed milk also is available in single-serving packages and is a great item for lunch boxes. Evaporated milk, available in cans in the baking aisle, can be substituted for liquid milk in most recipes.    Protein Foods: Stock up on canned or dried lentils and black, blount, cannellini, garbanzo and kidney beans. Toss cooked beans in salads, soups, stews and other dishes. Canned tuna, anchovies and sardines.    Grains: Keep a stash of oatmeal, buckwheat and other whole-grain cereals in the pantry. For an extra boost, add nuts and fresh berries to these hot cereals. Barley, farro, quinoa and other grains are staples for healthy meals. Also, keep a variety of rice on hand. Long grain, short grain, basmati and brown rice. Spaghetti, ziti, penne and other pastas are great for an easy, quick and filling meal. Give yourself an extra nutrition boost by buying whole-grain pasta or trying pasta made from legumes.    Also, stock up on:  Condiments: Ketchup, mustard and relish can be stored in the pantry until opened. Once you open them, keep them in the fridge.  Oil and vinegar: Extra-virgin olive oil is a versatile, heart-healthy option. Other oils, such as peanut, walnut and sesame, add a burst of flavor to meals.  different types of vinegar, such as cider, white and balsamic. Each imparts a unique flavor to your recipes.  Stock: Vegetable, chicken and beef stock are the basics of many recipes. Opt for low-sodium versions or ones with no added salt.  Herbs and spices:  small containers of ground herbs and spices. That way they are as fresh as possible when you use them.  Flax and other seeds: Flax  "and everton seeds deliver protein, dietary fiber and omega-3 fatty acids. Add them to cereal, salads, sauces and home-baked goods. If you buy whole flaxseed, make sure you grind it up before eating so your body can absorb the nutrients.    Five Food Groups Freezer List  Vegetables:  some of your favorite frozen veggies. Look for packages without sodium. And, while you are in the produce aisle, grab some fresh herbs. When you get home, fill ice cube trays with chopped herbs, top off the herbs with boiling water, and carefully place in the freezer. Add these herb cubes as you prepare meals for a punch of freshness.    Fruits: Stash frozen berries and other fruits in the freezer.     Milk and Dairy Products: Freeze Parmesan and other pre-shredded cheeses -- toss them into soups, stews and pasta dishes.     Protein Foods: Stock up on salmon and other fatty fishes to ensure you have ready access to healthy fats. Lean meats and poultry also store well in the freezer. One tip: Make sure you move it to the refrigerator one day before cooking to give adequate time for defrosting. Keep a variety of nuts in the freezer. This helps prevent them from spoiling. Add them to cold cereal, salads, hot grains and other dishes.    Grains: Whole-grain corn tortillas freeze well and can be used for quick breakfasts, lunches or dinners. Can t eat that loaf of bread fast enough while it is fresh? Make it a habit to freeze part of the loaf and defrost slices as you need them.    The Plate Method  [email or web address]    Protein Sources   [email or web address]     Quick/Easy Protein Sources:  Hard boiled eggs  Part-skim cheese sticks  Baby Bell cheese rounds  Low-fat/low-sugar Greek yogurt  Low-fat cottage cheese  Lean deli meat (chicken/turkey/ham)  Roasted chickpeas or lentils  Nuts   Turkey meat stick  Protein shake/bar  \"P3\" snack (cheese, nuts, deli meat)  Aldi's \"Protein Bread\"   \"Egglife\" egg white wrap  "   Tuna/salmon can/packet     Carbohydrates  [email or web address]     Mindful Eating  [email or web address]     Summary of Volumetrics Eating Plan  [email or web address]     Healthier Sweet alternatives:               - Yasso bars               - Frozen yogurt ice cream alternatives               - Nature s Bakery brownie alternatives               - Could consider black bean, sweet potato or avocado based brownies               - Trail mix              - Homemade or store bought mousse              - Dessert hummus with fruit              - Chocolate covered fruit/nuts               - Yogurt covered fruit/nuts      Meal Delivery Companies  Minimal prep:  Https://get.everyplate.Gr[employer]  BlueapronWAY Systems  Hungryroot.com  Hellofresh.com  HomechefWAY Systems    Ready-to-eat:  Https://www.Cyvera/r/home (meat and veggie focus for meals)  Https://[employer]/ (plant based)  https://www.Stir/our-menu ([facility])  https://www.Medivance/landing/home?[email or web address]    COMPREHENSIVE WEIGHT MANAGEMENT PROGRAM  VIRTUAL SUPPORT GROUPS    At [facility], our Comprehensive Weight Management program offers on-line support groups for patients who are working on weight loss and considering, preparing for, or have had weight loss surgery.     There is no cost for this opportunity.  You are invited to attend the?Virtual Support Groups?provided by any of the following locations:    [facility] via [employer] Teams with Filomena Randhawa RD, RN  2.   [place] via [employer] Teams with Clement Love, PhD, LP  3.   [place] via [employer] Teams with Maricarmen Dinero RN  4.   [facility] via a Zoom Meeting with ANTOINETTE Gao-    The following Support Group information can also be found on our website:  [email or web address]      [facility]   WEIGHT LOSS SURGERY SUPPORT  "GROUP  The support group is a patient-lead forum that meets monthly to share experiences, encouragement and education. It is open to those who have had weight loss surgery, are scheduled for surgery, or are considering surgery.   WHEN: 3rd Wednesday of each month from 5:00PM - 6:00PM using Microsoft Teams.   FACILITATOR: Led by Filomena Jimenez RD, LD, RN, the program's Clinical Coordinator.   TO REGISTER: Please contact the clinic via [employer] or call the nurse line directly at [phone] to inform our staff that you would like an invite sent to you and to let us know the email you would like the invite sent to. Prior to the meeting, a link with directions on how to join the meeting will be sent to you.    2024 Meetings September 18: Renita Chau, PhD, Outpatient Clinical Therapist, \"Pro Tips for Habit Change\".  October 16:  Let's Talk  This will be a time of group support.??   November 20:  Let's Talk  about How to Navigate the Upcoming Holiday Season.  December 18:  Let's Talk  and Celebrate the past year.       [facility] BARIATRIC CARE SUPPORT GROUP  This is open to all pre- and post- operative bariatric surgery patients as well as their support system.   WHEN: 3rd Tuesday of each month from 6:30 PM - 8:00 PM using Microsoft Teams.   FACILITATOR: Led by Clement Love, Ph.D who is a Licensed Psychologist with the [facility] Comprehensive Weight Management Program.   TO REGISTER: Please send an email to Clement Love, Ph.D., LP [email or web address]?if you would like an invitation to the group. Prior to the meeting, a link with directions on how to join the meeting will be sent to you.    2024 Meetings September 17: IN PERSON MEETING. [facility], [facility] [place], [place], [address], 1st floor Conference Room.  October 15: Date changed to OCTOBER 8th (2nd Tuesday).   November 19: \"Holiday Eating\", Sudha Louis RD, " "LD.  December 17: \"Hospital Stay and Compliance\", Maricarmen Dinero RN, CBN.      [facility] POST-OPERATIVE BARIATRIC SURGERY SUPPORT GROUP  This is a support group for [facility] bariatric patients (and those external to [facility]) who have had bariatric surgery and are at least 3 months post-surgery.  WHEN: 4th Thursday of the month from 11:00 AM - 12:00 PM using Microsoft Teams.   FACILITATOR: Led by Certified Bariatric Nurse, Maricarmen Dinero RN.   TO REGISTER: Please send an email to Maricarmen at [email or web address] if you would like an invitation to the group.  Prior to the meeting, a link with directions on how to join the meeting will be sent to you.    2024 Meetings September 26 October 24 November 28: No meeting  December 26    [facility]   HEALTHY LIFESTYLE COACHING GROUP  This is a 60 minute virtual coaching group for those who want to lead a healthier lifestyle. Come together to set goals and overcome barriers in a supportive group environment. We will address the four pillars of health: nutrition, exercise, sleep, and emotional well-being. This group is highly recommended for those who are participating in the 24 week Healthy Lifestyle Plan and our Health Coaching sessions.   WHEN: 1st Friday of the month, 12:30 PM - 1:30 PM   using a Zoom meeting.     FACILITATOR: Led by National Board-Certified Health and , Maricarmen Francois On license of [facility].  TO REGISTER: Please call the [employer] at [phone] to register. You will get an appointment to attend [facility]. Fifteen minutes prior to the meeting, complete the e-check in and you will get the link to join the meeting.  There is no charge to attend this group and space is limited.  Please register for each month you wish to attend    2024 Meetings  September 5 No meeting.  October 4 November 1 December 6 "

## 2025-01-02 NOTE — PROGRESS NOTES
"Video-Visit Details    Type of service:  Video Visit    Video Start Time: 11:31 AM    Video End Time: 12:01 PM     Originating Location (pt. Location): Home    Distant Location (provider location):  Offsite (providers home) [facility] [place]     Platform used for Video Visit: Kumbuya    New Weight Management Nutrition Consultation    Adan Britton is a 29 year old male presents today for new weight management nutrition consultation.  Patient referred by MONICO Herrera on 2024.    Patient with Co-morbidities of obesity includin/10/2024     2:16 PM   --   I have the following health issues associated with obesity Pre-Diabetes    High Cholesterol   I have the following symptoms associated with obesity Depression    Fatigue     Hx of induced vomiting.  Ages 16-20 would induce vomiting about once a month with the goal of maintaining weight after overeating. Stopped doing this for 8 years, estimates he has done this maybe 4 times over the last couple of years. Last purged about 1 year ago. Worked with therapist on this. Does not currently feel an urge to induce purging.     Anthropometrics:  Initial consult weight: 205 lb on 24   Estimated body mass index is 32.11 kg/m  as calculated from the following:    Height as of this encounter: 1.702 m (5' 7\").    Weight as of this encounter: 93 kg (205 lb).  Current Weight: 205 lb per patient report      Medications for Weight Loss:  No meds at this time     NUTRITION HISTORY  Food allergies: None   Food intolerances: lactose intolerant  - uses Lactase   Vitamin/Mineral Supplements: MVI, Vitamin D, Iron   Previous methods of diet modification for weight loss: intermittent fasting   RD before: No    Typically eats 2-4 meals a day.  Is able to get full. Can stay full until next meal. Does not  struggle with portion control. Does at times experience food noise. Might experience emotional eating once every 2-3 months " (stress). At times experiences a loss of control around eating, for example if eating ice cream will tend to eat a full pint. Gets take out 7 days a week.     In school right now. Has been trying to prepare more meals at home. Wants to have low effort meals. Has been boiling a lot vegetables.     Diet recall:   Eating 3 times a day.   Breakfast - pastry + sweetened coffee OR savory breakfast   Lunch - 4-5 bananas for lunch   Dinner - takeout   Snacks - bananas, sweetened nuts, sweetened popcorn, roasted peanuts   Hydration: Drinks starbucks coffee most mornings - mocha or flavored latte with oatmilk. Diet soda - 1-2 bottles daily.          12/10/2024     2:16 PM   Diet Recall Review with Patient   If you do eat breakfast, what types of food do you eat? Pastry   If you do eat lunch, what types of food do you typically eat? Semaj, burrito   How many cans/bottles of diet pop/soda/tea or sports drink do you drink in a day? 1   How often do you have a drink of alcohol? 2-4 Times a Month   If you do drink, how many drinks might you have in a day? 1 or 2           12/10/2024     2:16 PM   Eating Habits   Generally, my meals include foods like these bread, pasta, rice, potatoes, corn, crackers, sweet dessert, pop, or juice Almost Everyday   Generally, my meals include foods like these fried meats, brats, burgers, french fries, pizza, cheese, chips, or ice cream Once a Week   Eat fast food (like McDonalds, Burger Pa, Taco Bell) A Few Times a Week   Eat at a buffet or sit-down restaurant Less Than Weekly   Eat most of my meals in front of the TV or computer Everyday   Often skip meals, eat at random times, have no regular eating times Almost Everyday   Rarely sit down for a meal but snack or graze throughout A Few Times a Week   Eat extra snacks between meals A Few Times a Week   Eat most of my food at the end of the day Once a Week   Eat in the middle of the night or wake up at night to eat Less Than Weekly   Eat extra  snacks to prevent or correct low blood sugar Never   Eat to prevent acid reflux or stomach pain Less Than Weekly   Worry about not having enough food to eat Never   I eat when I am depressed A Few Times a Week   I eat when I am stressed A Few Times a Week   I eat when I am bored Once a Week   I eat when I am anxious A Few Times a Week   I eat when I am happy or as a reward Less Than Weekly   I feel hungry all the time even if I just have eaten Less Than Weekly   Feeling full is important to me A Few Times a Week   I finish all the food on my plate even if I am already full Less Than Weekly   I can't resist eating delicious food or walk past the good food/smell Never   I eat/snack without noticing that I am eating Never   I eat when I am preparing the meal Never   I eat more than usual when I see others eating Never   I have trouble not eating sweets, ice cream, cookies, or chips if they are around the house Everyday   I think about food all day Never   Please list any other foods you crave? Ice cream           12/10/2024     2:16 PM   Amount of Food   I feel out of control when eating Monthly   I eat a large amount of food, like a loaf of bread, a box of cookies, a pint/quart of ice cream, all at once Weekly   I eat a large amount of food even when I am not hungry Monthly   I eat rapidly Weekly   I eat alone because I feel embarrassed and do not want others to see how much I have eaten Never   I eat until I am uncomfortably full Monthly   I feel bad, disgusted, or guilty after I overeat Monthly     Physical Activity:  No structured exercise currently, struggles to exercise due to issues with motivation with mental health. Previously was walking more regularly. Did bouldering in the past, enjoyed this, struggling to find motivation to do this.         12/10/2024     2:16 PM   Activity/Exercise History   How much of a typical 12 hour day do you spend sitting? Half the Day   How much of a typical 12 hour day do you  spend lying down? Less Than Half the Day   How much of a typical day do you spend walking/standing? Less Than Half the Day   How many hours (not including work) do you spend on the TV/Video Games/Computer/Tablet/Phone? 6 Hours or More   How many times a week are you active for the purpose of exercise? Never   What keeps you from being more active? Lack of Time    Too tired     Nutrition Prescription  Recommended energy/nutrient modification.    Nutrition Diagnosis  Obesity r/t long history of positive energy balance aeb BMI >30.    Nutrition Intervention  Materials/education provided on hypocaloric diet for weight loss. Discussed incorporating more protein and fiber in your diet. Volumetric eating to help satiety level on fewer calories; portion control and healthy food choices (Plate Method and Volumetrics handouts), lower calorie snack choices, meal and snack planning tips and resources. Patient demonstrates understanding. Co-developed goals to work towards.   Provided pt with list of goals and resources below via LIFXt.     Expected Engagement: good    Nutrition Goals  1) Have a consistent eating schedule. Try to aim for at least 3 meals daily.   2) Ensure you have a good protein source at each meal. Aim for at least 60-90 grams of protein daily.   3) Increase fiber rich foods in your diet by having more fruits, vegetables, and whole grains.   4) Use the plate method as a guide for balanced meals.   5) Try to reduce sugar/calorie containing beverages and drink more water.     Faster meal component ideas:   Protein   Crockpot chicken  Rotisserie chicken  Deli meat  Ready made meat from store (Atlassian, Good & Gather)  Cottage cheese  Greek yogurt  String cheese  Scrambled/hard boiled eggs  Canned tuna/chicken/salmon (or pouches)  Frozen, already cooked shrimp  Nuts/Seeds (handful)  Nut Butter (2 tbsp)  Grains  Minute grain cups (lentils, rice, quinoa)   90 second grain pouches  Whole grain bread (i.e.  Nestor bread, etc.)  Carb balance tortillas  Quick oats  Whole wheat crackers  Fruit  Apples  Bananas  Peaches  Pears  Plums  Grapes  Berries (strawberries, blueberries, raspberries, blackberries)  Cherries  Frozen fruit  Fruit canned in 100% fruit juice  Vegetables  Mini cucumbers  Baby carrots  Mini bell peppers  Snap peas  Pre-cut vegetables (i.e. broccoli, brussels sprouts, etc.)  Frozen vegetables (steam in bag)  Canned vegetables (recommend low sodium options)  Other   Hemp hearts, everton, and other seeds      Five Food Groups Pantry List  Vegetables: Keep a variety of canned tomatoes in stock (diced, crushed, whole, stewed). Use them in soups, stews, sauces, casseroles and more! Also,  a jar of your favorite pasta sauce. Dried mushrooms are another great pantry item because they can add depth of flavor to your meals.    Fruits: Dried fruits such as raisins, dried cranberries and dried apricots provide dietary fiber. They also can add a punch of flavor to your morning breakfast, midday salad and dinner grains.    Milk and Dairy Products: Dried milk is a great back-up item to have on stock. You can use it in your coffee or tea. Boxed milk also is available in single-serving packages and is a great item for lunch boxes. Evaporated milk, available in cans in the baking aisle, can be substituted for liquid milk in most recipes.    Protein Foods: Stock up on canned or dried lentils and black, blount, cannellini, garbanzo and kidney beans. Toss cooked beans in salads, soups, stews and other dishes. Canned tuna, anchovies and sardines.    Grains: Keep a stash of oatmeal, buckwheat and other whole-grain cereals in the pantry. For an extra boost, add nuts and fresh berries to these hot cereals. Barley, farro, quinoa and other grains are staples for healthy meals. Also, keep a variety of rice on hand. Long grain, short grain, basmati and brown rice. Spaghetti, ziti, penne and other pastas are great for an easy,  quick and filling meal. Give yourself an extra nutrition boost by buying whole-grain pasta or trying pasta made from legumes.    Also, stock up on:  Condiments: Ketchup, mustard and relish can be stored in the pantry until opened. Once you open them, keep them in the fridge.  Oil and vinegar: Extra-virgin olive oil is a versatile, heart-healthy option. Other oils, such as peanut, walnut and sesame, add a burst of flavor to meals.  different types of vinegar, such as cider, white and balsamic. Each imparts a unique flavor to your recipes.  Stock: Vegetable, chicken and beef stock are the basics of many recipes. Opt for low-sodium versions or ones with no added salt.  Herbs and spices:  small containers of ground herbs and spices. That way they are as fresh as possible when you use them.  Flax and other seeds: Flax and everton seeds deliver protein, dietary fiber and omega-3 fatty acids. Add them to cereal, salads, sauces and home-baked goods. If you buy whole flaxseed, make sure you grind it up before eating so your body can absorb the nutrients.    Five Food Groups Freezer List  Vegetables:  some of your favorite frozen veggies. Look for packages without sodium. And, while you are in the produce aisle, grab some fresh herbs. When you get home, fill ice cube trays with chopped herbs, top off the herbs with boiling water, and carefully place in the freezer. Add these herb cubes as you prepare meals for a punch of freshness.    Fruits: Stash frozen berries and other fruits in the freezer.     Milk and Dairy Products: Freeze Parmesan and other pre-shredded cheeses -- toss them into soups, stews and pasta dishes.     Protein Foods: Stock up on salmon and other fatty fishes to ensure you have ready access to healthy fats. Lean meats and poultry also store well in the freezer. One tip: Make sure you move it to the refrigerator one day before cooking to give adequate time for defrosting. Keep a variety of  "nuts in the freezer. This helps prevent them from spoiling. Add them to cold cereal, salads, hot grains and other dishes.    Grains: Whole-grain corn tortillas freeze well and can be used for quick breakfasts, lunches or dinners. Can t eat that loaf of bread fast enough while it is fresh? Make it a habit to freeze part of the loaf and defrost slices as you need them.    The Plate Method  [email or web address]    Protein Sources   [email or web address]     Quick/Easy Protein Sources:  Hard boiled eggs  Part-skim cheese sticks  Baby Bell cheese rounds  Low-fat/low-sugar Greek yogurt  Low-fat cottage cheese  Lean deli meat (chicken/turkey/ham)  Roasted chickpeas or lentils  Nuts   Turkey meat stick  Protein shake/bar  \"P3\" snack (cheese, nuts, deli meat)  Aldi's \"Protein Bread\"   \"Egglife\" egg white wrap    Tuna/salmon can/packet     Carbohydrates  [email or web address]     Mindful Eating  [email or web address]     Summary of Volumetrics Eating Plan  [email or web address]     Healthier Sweet alternatives:               - Yasso bars               - Frozen yogurt ice cream alternatives               - Nature s Bakery brownie alternatives               - Could consider black bean, sweet potato or avocado based brownies               - Trail mix              - Homemade or store bought mousse              - Dessert hummus with fruit              - Chocolate covered fruit/nuts               - Yogurt covered fruit/nuts      Meal Delivery Companies  Minimal prep:  Https://get.everyplate.Momentum Telecom  Blueapron.com  Hungryroot.com  HellofreshWP Engine    Ready-to-eat:  Https://www.Innovatus Technology.com/r/home (meat and veggie focus for meals)  Https://DonorSearch/ (plant based)  https://www.SOMARK Innovations.Momentum Telecom/our-menu ([facility])  https://www.Hooja/landing/home?[email or web address]    Follow-Up: 1-2 months or as needed.     Time spent with patient: 29 minutes.  Una" AVIS Jackson, LD

## 2025-01-02 NOTE — NURSING NOTE
Current patient location: Patient declined to provide     Is the patient currently in the state of [place]? YES    Visit mode:VIDEO    If the visit is dropped, the patient can be reconnected by:VIDEO VISIT: Text to cell phone:   Telephone Information:   Mobile [phone]       Will anyone else be joining the visit? NO  (If patient encounters technical issues they should call [phone]    Are changes needed to the allergy or medication list? N/A    Are refills needed on medications prescribed by this physician? NO    Rooming Documentation:  Questionnaire(s) not pre-assigned    Reason for visit: Consult    Shelby Kocher VVF

## 2025-03-16 ENCOUNTER — HEALTH MAINTENANCE LETTER (OUTPATIENT)
Age: 30
End: 2025-03-16

## 2025-03-19 ENCOUNTER — TELEPHONE (OUTPATIENT)
Dept: ENDOCRINOLOGY | Facility: CLINIC | Age: 30
End: 2025-03-19
Payer: COMMERCIAL

## 2025-03-19 NOTE — TELEPHONE ENCOUNTER
Patient confirmed scheduled appointment:     Date: 5/14/25  Time: 11:30   Visit type: Return Weight Management  Visit mode: Virtual Visit  Provider:  Monique Seymour NP  [place] [facility]    Additional Notes:   Rescheduled from 4/2/25 with Terri Gilman PA-C

## 2025-04-09 DIAGNOSIS — Z01.10 ENCOUNTER FOR HEARING EXAMINATION: Primary | ICD-10-CM

## 2025-04-16 ENCOUNTER — PRE VISIT (OUTPATIENT)
Dept: OTOLARYNGOLOGY | Facility: CLINIC | Age: 30
End: 2025-04-16

## 2025-04-16 ENCOUNTER — OFFICE VISIT (OUTPATIENT)
Dept: AUDIOLOGY | Facility: CLINIC | Age: 30
End: 2025-04-16
Payer: COMMERCIAL

## 2025-04-16 ENCOUNTER — TELEPHONE (OUTPATIENT)
Dept: OTOLARYNGOLOGY | Facility: CLINIC | Age: 30
End: 2025-04-16

## 2025-04-16 ENCOUNTER — OFFICE VISIT (OUTPATIENT)
Dept: OTOLARYNGOLOGY | Facility: CLINIC | Age: 30
End: 2025-04-16
Payer: COMMERCIAL

## 2025-04-16 DIAGNOSIS — H90.11 CONDUCTIVE HEARING LOSS IN RIGHT EAR: ICD-10-CM

## 2025-04-16 DIAGNOSIS — H61.23 EXCESSIVE CERUMEN IN BOTH EAR CANALS: Primary | ICD-10-CM

## 2025-04-16 DIAGNOSIS — H91.8X3 ASYMMETRICAL HEARING LOSS: Primary | ICD-10-CM

## 2025-04-16 DIAGNOSIS — H91.90 HEARING DIFFICULTY, UNSPECIFIED LATERALITY: ICD-10-CM

## 2025-04-16 NOTE — LETTER
4/16/2025       RE: Adan Britton  [address] [address]     Dear Colleague,    Thank you for referring your patient, Adan Britton, to the [facility] [place] [facility]. Please see a copy of my visit note below.      Otolaryngology Clinic  April 16, 2025    HPI:  Adan Britton is here for an ear cleaning prior to audiogram. Patient reports difficulty hearing certain words. History of occupational noise exposure. Patient denies any otalgia, otorrhea, family history of hearing loss, history of frequent ear infections, or ear surgeries. Patient does report frequent ear popping that is a tic.    Otologic microscope exam:    Biocular Microscopy exam is needed due to deep impaction of cerumen of bilateral ears, requiring direct visualization for use of cleaning instruments.    Right ear was examined under the microscope.  Cerumen at entrance of canal noted. It was cleaned with right angle hook and alligator forceps. Once cleaned, TM visualized under microscope. TM intact but retracted. TM insufflated by patient a couple times during cleaning. Cannot visualize any fluid in the middle ear.     Left ear was also examined under the microscope.  Cerumen at inferior canal noted. It was cleaned with right angle hook and suction. Once cleaned, TM visualized under microscope. Normal appearing TM, nicely aerated middle ear space.     Assessment and Plan:  Patient presents with for an ear cleaning prior to audiogram. The patient's ears are cleaned today.  May proceed with audiogram as scheduled. Return as needed for cleaning.     Will chart check audiogram and send a message with follow up recommendations if results of audiogram show any abnormal findings including asymmetric hearing loss or word recognition score, conductive hearing loss, or abnormal tympanograms.     Cynthia Flores DNP, APRN, CNP  Otolaryngology  Head & Neck  Surgery  [phone]    15 minutes spent on the date of the encounter doing chart review, history and exam, documentation and further activities per the note excluding time performing ear cleaning under microscopy.      Again, thank you for allowing me to participate in the care of your patient.      Sincerely,    Anna lFores NP

## 2025-04-16 NOTE — PROGRESS NOTES
AUDIOLOGY REPORT    SUMMARY: Audiology visit completed. See audiogram for results.      RECOMMENDATIONS: Follow-up with ENT.    Jazzmine Bucio. CCC-A  Licensed Audiologist   [place] #[id number]

## 2025-04-28 ENCOUNTER — LAB (OUTPATIENT)
Dept: LAB | Facility: CLINIC | Age: 30
End: 2025-04-28
Payer: COMMERCIAL

## 2025-04-28 DIAGNOSIS — Z80.42 FAMILY HISTORY OF MALIGNANT NEOPLASM OF PROSTATE: ICD-10-CM

## 2025-04-28 DIAGNOSIS — Z80.0 FAMILY HISTORY OF MALIGNANT NEOPLASM OF COLON: ICD-10-CM

## 2025-04-28 DIAGNOSIS — Z80.3 FAMILY HISTORY OF MALIGNANT NEOPLASM OF BREAST: ICD-10-CM

## 2025-04-28 DIAGNOSIS — Z80.0 FAMILY HISTORY OF MALIGNANT NEOPLASM OF PANCREAS: ICD-10-CM

## 2025-04-28 LAB
PERFORMING LABORATORY: NORMAL
SPECIMEN STATUS: NORMAL
TEST NAME: NORMAL

## 2025-04-28 PROCEDURE — 36415 COLL VENOUS BLD VENIPUNCTURE: CPT

## 2025-05-05 LAB
SCANNED LAB RESULT: NORMAL
TEST NAME: NORMAL

## 2025-05-07 DIAGNOSIS — Z80.0 FAMILY HISTORY OF MALIGNANT NEOPLASM OF PANCREAS: ICD-10-CM

## 2025-05-07 DIAGNOSIS — Z80.42 FAMILY HISTORY OF MALIGNANT NEOPLASM OF PROSTATE: ICD-10-CM

## 2025-05-07 DIAGNOSIS — Z80.3 FAMILY HISTORY OF MALIGNANT NEOPLASM OF BREAST: Primary | ICD-10-CM

## 2025-05-07 DIAGNOSIS — Z80.0 FAMILY HISTORY OF MALIGNANT NEOPLASM OF COLON: ICD-10-CM

## 2025-05-20 ENCOUNTER — VIRTUAL VISIT (OUTPATIENT)
Dept: ONCOLOGY | Facility: CLINIC | Age: 30
End: 2025-05-20
Attending: GENETIC COUNSELOR, MS
Payer: COMMERCIAL

## 2025-05-20 DIAGNOSIS — Z80.0 FAMILY HISTORY OF MALIGNANT NEOPLASM OF PANCREAS: Primary | ICD-10-CM

## 2025-05-20 DIAGNOSIS — Z80.42 FAMILY HISTORY OF MALIGNANT NEOPLASM OF PROSTATE: ICD-10-CM

## 2025-05-20 DIAGNOSIS — Z80.0 FAMILY HISTORY OF MALIGNANT NEOPLASM OF COLON: ICD-10-CM

## 2025-05-20 DIAGNOSIS — Z80.3 FAMILY HISTORY OF MALIGNANT NEOPLASM OF BREAST: ICD-10-CM

## 2025-05-20 PROCEDURE — 999N000069 HC STATISTIC GENETIC COUNSELING, < 16 MIN: Mod: GT,95 | Performed by: GENETIC COUNSELOR, MS

## 2025-05-20 NOTE — LETTER
"May 20, 2025    Adan Malin Reba  [address] [address]    Dear Adan Malin,    It was a pleasure speaking with you over video on 5/20/2025. Here is a copy of the progress note from our discussion. If you have any additional questions, please feel free to call.    Referring Provider: MEERA Lara CNP    Presenting Information:  I spoke to Adan Malin by video today to discuss his genetic testing results. His blood was drawn on 4/28/25. A Custom Cancer panel was ordered from [facility]. This testing was done because of Adan Malin's family history of cancer.    Genetic Testing Result: NEGATIVE  Adan Malin is negative for any pathogenic (harmful) mutations in the AIP, ALK, ANKRD26, APC, GLO, AXIN2, BAP1, BARD1, BLM, BMPR1A, BRCA1, BRCA2, BRIP1, CBL, CDC73, CDH1, CDK4, CDKN1B, CDKN2A, CEBPA, CHEK2, CTNNA1, DDX41, DICER1, EGFR, ELANE, EPCAM, IAOF8V7, ETV6, FH, FLCN, G6PC3, GATA2, GFI1, GREM1, HAX1, HOXB13, IKZF1, KIT, KRAS, LZTR1, MAX, MECOM, MEN1, MET, MITF, MLH1, MSH2, MSH3, MSH6, MUTYH, NBN, NF1, NF2, NTHL1, PALB2, PDGFRA, PMS2, POLD1, POLE, POT1, RMWQS9C, PTCH1, PTEN, PTPN11, RAD51C, RAD51D, RB1, RET, RTEL1, RUNX1, SAMD9, SAMD9L, SDHA, SDHAF2, SDHB, SDHC, SDHD, SMAD4, SMARCA4, SMARCB1, SMARCE1, SRP72, STK11, SUFU, TERC, TERT, PWVY423, TP53, TSC1, TSC2, and VHL genes. No pathogenic mutations were found in any of the 92 genes analyzed. This test involved sequencing and deletion/duplication analysis of all genes with the exceptions of EPCAM and GREM1 (deletions/duplications only), MITF (only the status of the c.952G>A (p.E318K) alteration is analyzed and reported), and SDHA (sequencing only).        A copy of the test report can be found in the Laboratory tab, dated 4/28/25, and named \"LABORATORY MISCELLANEOUS RESULT\". The report is scanned in as a linked document.    Interpretation:  We discussed several different interpretations of this negative test result.    One explanation may be that there is a different " gene or combination of genes and environment that are associated with the cancers in this family.  It is possible that another close relative with cancer did have a mutation in one of these genes and he did not inherit it.  There is also a small possibility that there is a mutation in one of these genes, and the testing laboratory could not find it with their current testing methods.       Screening:  Based on this negative test result, it is important for Adan Malin and his relatives to refer back to the family history for appropriate cancer screening.    Population cancer screening options, such as those recommended by the American Cancer Society and the National Comprehensive Cancer Network (NCCN), are also appropriate for Adan Malin and his family. These screening recommendations may change if there are changes to Adan Malin's personal and/or family history of cancer. Final screening recommendations should be made in consultation with each individual's primary care provider.      Inheritance:  We reviewed autosomal dominant inheritance. We discussed that Adan Malin cannot pass on an identifiable mutation in these genes to his children based on this test result. Mutations in these genes do not skip generations.      Additional Testing Considerations:  Although Adan Malin's genetic testing result was negative, other relatives may still carry a gene mutation associated with colon, pancreatic, breast, and/or prostate cancer. Genetic counseling is recommended for other close paternal relatives, such as his father, to discuss genetic testing options. If any of these relatives do pursue genetic testing, Adan Mailn is encouraged to contact me so that we may review the impact of their test results on him.    Summary:  We do not have an explanation for Adan Malin's family history of cancer. While no genetic changes were identified, Adan Malin may still be at risk for certain cancers due to family history, environmental factors, or other genetic  causes not identified by this test. Because of that, it is important that he continue with cancer screening based on his personal and family history as discussed above.    Genetic testing is rapidly advancing, and new cancer susceptibility genes will most likely be identified in the future. Therefore, I encouraged Adan Malin to contact me regularly or if there are changes in his personal or family history. This may change how we assess his cancer risk, screening, and the testing we would offer.    Plan:  1.  A copy of the test results will be sent to Adan Malin.  2. He plans to follow-up with his other providers.  3. He should contact me regularly, or sooner if his family history changes.    If Adan Malin has any further questions, I encouraged him to contact me via [employer].    I spent 14 minutes on the date of the encounter doing chart review, history and exam, documentation and further activities as noted above.    Yobani Muñoz MS, [facility]  Licensed, Certified Genetic Counselor

## 2025-05-20 NOTE — NURSING NOTE
Current patient location: [address] [place] [address]    Is the patient currently in the state of [place]? YES    Visit mode: VIDEO    If the visit is dropped, the patient can be reconnected by:VIDEO VISIT: Text to cell phone:   Telephone Information:   Mobile [phone]       Will anyone else be joining the visit? NO  (If patient encounters technical issues they should call [phone])    Are changes needed to the allergy or medication list? N/A    Are refills needed on medications prescribed by this physician? NO    Rooming Documentation:  Not applicable    Reason for visit: JAIMIEECK    ELVIE BRANDT

## 2025-05-20 NOTE — Clinical Note
"5/20/2025      Adan Britton  [address] [address]      Dear Colleague,    Thank you for referring your patient, Adan Britton, to the [facility] CANCER [facility]. Please see a copy of my visit note below.    Virtual Visit Details    Type of service:  Video Visit     Originating Location (pt. Location): {video visit patient location:063649::\"Home\"}  {PROVIDER LOCATION On-site should be selected for visits conducted from your clinic location or adjoining [facility] hospital, academic office, or other nearby [facility] building. Off-site should be selected for all other provider locations, including home:167005}  Distant Location (provider location):  {virtual location provider:[id number]}  Platform used for Video Visit: {Virtual Visit Platforms:836567::\"Univision\"}      Again, thank you for allowing me to participate in the care of your patient.        Sincerely,        Yobani Muñoz GC    Electronically signed"

## 2025-05-20 NOTE — PROGRESS NOTES
"5/20/2025    Referring Provider: MEERA Lara CNP    Presenting Information:  I spoke to Adan Malin by video today to discuss his genetic testing results. His blood was drawn on 4/28/25. A Custom Cancer panel was ordered from [facility]. This testing was done because of Adan Malin's family history of cancer.    Genetic Testing Result: NEGATIVE  Adan Malin is negative for any pathogenic (harmful) mutations in the AIP, ALK, ANKRD26, APC, GLO, AXIN2, BAP1, BARD1, BLM, BMPR1A, BRCA1, BRCA2, BRIP1, CBL, CDC73, CDH1, CDK4, CDKN1B, CDKN2A, CEBPA, CHEK2, CTNNA1, DDX41, DICER1, EGFR, ELANE, EPCAM, KKAZ8Y5, ETV6, FH, FLCN, G6PC3, GATA2, GFI1, GREM1, HAX1, HOXB13, IKZF1, KIT, KRAS, LZTR1, MAX, MECOM, MEN1, MET, MITF, MLH1, MSH2, MSH3, MSH6, MUTYH, NBN, NF1, NF2, NTHL1, PALB2, PDGFRA, PMS2, POLD1, POLE, POT1, TOWOG1K, PTCH1, PTEN, PTPN11, RAD51C, RAD51D, RB1, RET, RTEL1, RUNX1, SAMD9, SAMD9L, SDHA, SDHAF2, SDHB, SDHC, SDHD, SMAD4, SMARCA4, SMARCB1, SMARCE1, SRP72, STK11, SUFU, TERC, TERT, GCTK924, TP53, TSC1, TSC2, and VHL genes. No pathogenic mutations were found in any of the 92 genes analyzed. This test involved sequencing and deletion/duplication analysis of all genes with the exceptions of EPCAM and GREM1 (deletions/duplications only), MITF (only the status of the c.952G>A (p.E318K) alteration is analyzed and reported), and SDHA (sequencing only).        A copy of the test report can be found in the Laboratory tab, dated 4/28/25, and named \"LABORATORY MISCELLANEOUS RESULT\". The report is scanned in as a linked document.    Interpretation:  We discussed several different interpretations of this negative test result.    One explanation may be that there is a different gene or combination of genes and environment that are associated with the cancers in this family.  It is possible that another close relative with cancer did have a mutation in one of these genes and he did not inherit it.  There is also a small possibility that " there is a mutation in one of these genes, and the testing laboratory could not find it with their current testing methods.       Screening:  Based on this negative test result, it is important for Adan Malin and his relatives to refer back to the family history for appropriate cancer screening.    Population cancer screening options, such as those recommended by the American Cancer Society and the National Comprehensive Cancer Network (NCCN), are also appropriate for Adan Malin and his family. These screening recommendations may change if there are changes to Adan Malin's personal and/or family history of cancer. Final screening recommendations should be made in consultation with each individual's primary care provider.      Inheritance:  We reviewed autosomal dominant inheritance. We discussed that Adan Malin cannot pass on an identifiable mutation in these genes to his children based on this test result. Mutations in these genes do not skip generations.      Additional Testing Considerations:  Although Adan Malin's genetic testing result was negative, other relatives may still carry a gene mutation associated with colon, pancreatic, breast, and/or prostate cancer. Genetic counseling is recommended for other close paternal relatives, such as his father, to discuss genetic testing options. If any of these relatives do pursue genetic testing, Adan Malin is encouraged to contact me so that we may review the impact of their test results on him.    Summary:  We do not have an explanation for Adan Malin's family history of cancer. While no genetic changes were identified, Adan Malin may still be at risk for certain cancers due to family history, environmental factors, or other genetic causes not identified by this test. Because of that, it is important that he continue with cancer screening based on his personal and family history as discussed above.    Genetic testing is rapidly advancing, and new cancer susceptibility genes will most likely  be identified in the future. Therefore, I encouraged Adan Malin to contact me regularly or if there are changes in his personal or family history. This may change how we assess his cancer risk, screening, and the testing we would offer.    Plan:  1.  A copy of the test results will be sent to Adan Malin.  2. He plans to follow-up with his other providers.  3. He should contact me regularly, or sooner if his family history changes.    If Adan Malin has any further questions, I encouraged him to contact me via [facility].    I spent 14 minutes on the date of the encounter doing chart review, history and exam, documentation and further activities as noted above.    Yobani Muñoz MS, [facility]  Licensed, Certified Genetic Counselor      Virtual Visit Details    Type of service:  Video Visit     Originating Location (pt. Location): Home  Distant Location (provider location):  Off-site  Platform used for Video Visit: Beatris